# Patient Record
Sex: FEMALE | Race: WHITE | NOT HISPANIC OR LATINO | URBAN - METROPOLITAN AREA
[De-identification: names, ages, dates, MRNs, and addresses within clinical notes are randomized per-mention and may not be internally consistent; named-entity substitution may affect disease eponyms.]

---

## 2019-11-11 ENCOUNTER — INPATIENT (INPATIENT)
Facility: HOSPITAL | Age: 34
LOS: 5 days | Discharge: HOME | DRG: 988 | End: 2019-11-17
Attending: SURGERY | Admitting: SURGERY
Payer: MEDICAID

## 2019-11-11 VITALS
SYSTOLIC BLOOD PRESSURE: 117 MMHG | HEART RATE: 110 BPM | DIASTOLIC BLOOD PRESSURE: 72 MMHG | RESPIRATION RATE: 16 BRPM | HEIGHT: 63 IN | TEMPERATURE: 98 F | OXYGEN SATURATION: 97 % | WEIGHT: 210.1 LBS

## 2019-11-11 DIAGNOSIS — S36.039A UNSPECIFIED LACERATION OF SPLEEN, INITIAL ENCOUNTER: ICD-10-CM

## 2019-11-11 LAB
ALBUMIN SERPL ELPH-MCNC: 3.5 G/DL — SIGNIFICANT CHANGE UP (ref 3.3–5)
ALP SERPL-CCNC: 74 U/L — SIGNIFICANT CHANGE UP (ref 40–120)
ALT FLD-CCNC: 19 U/L — SIGNIFICANT CHANGE UP (ref 10–45)
ANION GAP SERPL CALC-SCNC: 13 MMOL/L — SIGNIFICANT CHANGE UP (ref 5–17)
APTT BLD: 26.8 SEC — LOW (ref 27.5–36.3)
AST SERPL-CCNC: 26 U/L — SIGNIFICANT CHANGE UP (ref 10–40)
BASE EXCESS BLDV CALC-SCNC: -2.6 MMOL/L — LOW (ref -2–2)
BASOPHILS # BLD AUTO: 0.03 K/UL — SIGNIFICANT CHANGE UP (ref 0–0.2)
BASOPHILS # BLD AUTO: 0.04 K/UL — SIGNIFICANT CHANGE UP (ref 0–0.2)
BASOPHILS NFR BLD AUTO: 0.2 % — SIGNIFICANT CHANGE UP (ref 0–2)
BASOPHILS NFR BLD AUTO: 0.3 % — SIGNIFICANT CHANGE UP (ref 0–2)
BILIRUB SERPL-MCNC: 0.2 MG/DL — SIGNIFICANT CHANGE UP (ref 0.2–1.2)
BLD GP AB SCN SERPL QL: NEGATIVE — SIGNIFICANT CHANGE UP
BUN SERPL-MCNC: 22 MG/DL — SIGNIFICANT CHANGE UP (ref 7–23)
CA-I SERPL-SCNC: 1.19 MMOL/L — SIGNIFICANT CHANGE UP (ref 1.12–1.3)
CALCIUM SERPL-MCNC: 8.8 MG/DL — SIGNIFICANT CHANGE UP (ref 8.4–10.5)
CHLORIDE BLDV-SCNC: 115 MMOL/L — HIGH (ref 96–108)
CHLORIDE SERPL-SCNC: 104 MMOL/L — SIGNIFICANT CHANGE UP (ref 96–108)
CO2 BLDV-SCNC: 24 MMOL/L — SIGNIFICANT CHANGE UP (ref 22–30)
CO2 SERPL-SCNC: 23 MMOL/L — SIGNIFICANT CHANGE UP (ref 22–31)
CREAT SERPL-MCNC: 1.16 MG/DL — SIGNIFICANT CHANGE UP (ref 0.5–1.3)
EOSINOPHIL # BLD AUTO: 0.15 K/UL — SIGNIFICANT CHANGE UP (ref 0–0.5)
EOSINOPHIL # BLD AUTO: 0.35 K/UL — SIGNIFICANT CHANGE UP (ref 0–0.5)
EOSINOPHIL NFR BLD AUTO: 1.2 % — SIGNIFICANT CHANGE UP (ref 0–6)
EOSINOPHIL NFR BLD AUTO: 2.8 % — SIGNIFICANT CHANGE UP (ref 0–6)
GAS PNL BLDA: SIGNIFICANT CHANGE UP
GAS PNL BLDV: 137 MMOL/L — SIGNIFICANT CHANGE UP (ref 135–145)
GAS PNL BLDV: SIGNIFICANT CHANGE UP
GLUCOSE BLDV-MCNC: 87 MG/DL — SIGNIFICANT CHANGE UP (ref 70–99)
GLUCOSE SERPL-MCNC: 128 MG/DL — HIGH (ref 70–99)
HCO3 BLDV-SCNC: 22 MMOL/L — SIGNIFICANT CHANGE UP (ref 21–29)
HCT VFR BLD CALC: 23.9 % — LOW (ref 34.5–45)
HCT VFR BLD CALC: 28.3 % — LOW (ref 34.5–45)
HCT VFR BLD CALC: 33.4 % — LOW (ref 34.5–45)
HCT VFR BLDA CALC: 34 % — LOW (ref 39–50)
HGB BLD CALC-MCNC: 11.1 G/DL — LOW (ref 11.5–15.5)
HGB BLD-MCNC: 10.7 G/DL — LOW (ref 11.5–15.5)
HGB BLD-MCNC: 7.9 G/DL — LOW (ref 11.5–15.5)
HGB BLD-MCNC: 9.4 G/DL — LOW (ref 11.5–15.5)
HOROWITZ INDEX BLDV+IHG-RTO: 28 — SIGNIFICANT CHANGE UP
IMM GRANULOCYTES NFR BLD AUTO: 0.2 % — SIGNIFICANT CHANGE UP (ref 0–1.5)
IMM GRANULOCYTES NFR BLD AUTO: 0.4 % — SIGNIFICANT CHANGE UP (ref 0–1.5)
INR BLD: 1.05 RATIO — SIGNIFICANT CHANGE UP (ref 0.88–1.16)
LACTATE BLDV-MCNC: 1.4 MMOL/L — SIGNIFICANT CHANGE UP (ref 0.7–2)
LYMPHOCYTES # BLD AUTO: 17.2 % — SIGNIFICANT CHANGE UP (ref 13–44)
LYMPHOCYTES # BLD AUTO: 2.17 K/UL — SIGNIFICANT CHANGE UP (ref 1–3.3)
LYMPHOCYTES # BLD AUTO: 25.2 % — SIGNIFICANT CHANGE UP (ref 13–44)
LYMPHOCYTES # BLD AUTO: 3.15 K/UL — SIGNIFICANT CHANGE UP (ref 1–3.3)
MCHC RBC-ENTMCNC: 29.2 PG — SIGNIFICANT CHANGE UP (ref 27–34)
MCHC RBC-ENTMCNC: 29.9 PG — SIGNIFICANT CHANGE UP (ref 27–34)
MCHC RBC-ENTMCNC: 30.1 PG — SIGNIFICANT CHANGE UP (ref 27–34)
MCHC RBC-ENTMCNC: 32 GM/DL — SIGNIFICANT CHANGE UP (ref 32–36)
MCHC RBC-ENTMCNC: 33.1 GM/DL — SIGNIFICANT CHANGE UP (ref 32–36)
MCHC RBC-ENTMCNC: 33.2 GM/DL — SIGNIFICANT CHANGE UP (ref 32–36)
MCV RBC AUTO: 90.5 FL — SIGNIFICANT CHANGE UP (ref 80–100)
MCV RBC AUTO: 90.7 FL — SIGNIFICANT CHANGE UP (ref 80–100)
MCV RBC AUTO: 91.3 FL — SIGNIFICANT CHANGE UP (ref 80–100)
MONOCYTES # BLD AUTO: 0.74 K/UL — SIGNIFICANT CHANGE UP (ref 0–0.9)
MONOCYTES # BLD AUTO: 0.75 K/UL — SIGNIFICANT CHANGE UP (ref 0–0.9)
MONOCYTES NFR BLD AUTO: 5.9 % — SIGNIFICANT CHANGE UP (ref 2–14)
MONOCYTES NFR BLD AUTO: 6 % — SIGNIFICANT CHANGE UP (ref 2–14)
NEUTROPHILS # BLD AUTO: 8.18 K/UL — HIGH (ref 1.8–7.4)
NEUTROPHILS # BLD AUTO: 9.46 K/UL — HIGH (ref 1.8–7.4)
NEUTROPHILS NFR BLD AUTO: 65.6 % — SIGNIFICANT CHANGE UP (ref 43–77)
NEUTROPHILS NFR BLD AUTO: 75 % — SIGNIFICANT CHANGE UP (ref 43–77)
NRBC # BLD: 0 /100 WBCS — SIGNIFICANT CHANGE UP (ref 0–0)
OTHER CELLS CSF MANUAL: 9 ML/DL — LOW (ref 18–22)
PCO2 BLDV: 43 MMHG — SIGNIFICANT CHANGE UP (ref 35–50)
PH BLDV: 7.34 — LOW (ref 7.35–7.45)
PLATELET # BLD AUTO: 155 K/UL — SIGNIFICANT CHANGE UP (ref 150–400)
PLATELET # BLD AUTO: 177 K/UL — SIGNIFICANT CHANGE UP (ref 150–400)
PLATELET # BLD AUTO: 181 K/UL — SIGNIFICANT CHANGE UP (ref 150–400)
PO2 BLDV: 38 MMHG — SIGNIFICANT CHANGE UP (ref 25–45)
POTASSIUM BLDV-SCNC: 3.7 MMOL/L — SIGNIFICANT CHANGE UP (ref 3.5–5.3)
POTASSIUM SERPL-MCNC: 4.8 MMOL/L — SIGNIFICANT CHANGE UP (ref 3.5–5.3)
POTASSIUM SERPL-SCNC: 4.8 MMOL/L — SIGNIFICANT CHANGE UP (ref 3.5–5.3)
PROT SERPL-MCNC: 6.1 G/DL — SIGNIFICANT CHANGE UP (ref 6–8.3)
PROTHROM AB SERPL-ACNC: 12 SEC — SIGNIFICANT CHANGE UP (ref 10–12.9)
RBC # BLD: 2.64 M/UL — LOW (ref 3.8–5.2)
RBC # BLD: 3.12 M/UL — LOW (ref 3.8–5.2)
RBC # BLD: 3.66 M/UL — LOW (ref 3.8–5.2)
RBC # FLD: 13.1 % — SIGNIFICANT CHANGE UP (ref 10.3–14.5)
RBC # FLD: 13.2 % — SIGNIFICANT CHANGE UP (ref 10.3–14.5)
RBC # FLD: 13.3 % — SIGNIFICANT CHANGE UP (ref 10.3–14.5)
RH IG SCN BLD-IMP: POSITIVE — SIGNIFICANT CHANGE UP
RH IG SCN BLD-IMP: POSITIVE — SIGNIFICANT CHANGE UP
SAO2 % BLDV: 60 % — LOW (ref 67–88)
SODIUM SERPL-SCNC: 140 MMOL/L — SIGNIFICANT CHANGE UP (ref 135–145)
WBC # BLD: 12.49 K/UL — HIGH (ref 3.8–10.5)
WBC # BLD: 12.61 K/UL — HIGH (ref 3.8–10.5)
WBC # BLD: 13.98 K/UL — HIGH (ref 3.8–10.5)
WBC # FLD AUTO: 12.49 K/UL — HIGH (ref 3.8–10.5)
WBC # FLD AUTO: 12.61 K/UL — HIGH (ref 3.8–10.5)
WBC # FLD AUTO: 13.98 K/UL — HIGH (ref 3.8–10.5)

## 2019-11-11 PROCEDURE — 37244 VASC EMBOLIZE/OCCLUDE BLEED: CPT

## 2019-11-11 PROCEDURE — 99291 CRITICAL CARE FIRST HOUR: CPT

## 2019-11-11 PROCEDURE — 73030 X-RAY EXAM OF SHOULDER: CPT | Mod: 26,RT

## 2019-11-11 PROCEDURE — 74177 CT ABD & PELVIS W/CONTRAST: CPT | Mod: 26

## 2019-11-11 PROCEDURE — 71260 CT THORAX DX C+: CPT | Mod: 26

## 2019-11-11 PROCEDURE — 76937 US GUIDE VASCULAR ACCESS: CPT | Mod: 26

## 2019-11-11 PROCEDURE — 93010 ELECTROCARDIOGRAM REPORT: CPT

## 2019-11-11 PROCEDURE — 71045 X-RAY EXAM CHEST 1 VIEW: CPT | Mod: 26

## 2019-11-11 PROCEDURE — 36247 INS CATH ABD/L-EXT ART 3RD: CPT

## 2019-11-11 PROCEDURE — 36248 INS CATH ABD/L-EXT ART ADDL: CPT

## 2019-11-11 RX ORDER — ACETAMINOPHEN 500 MG
975 TABLET ORAL ONCE
Refills: 0 | Status: COMPLETED | OUTPATIENT
Start: 2019-11-11 | End: 2019-11-11

## 2019-11-11 RX ORDER — ACETAMINOPHEN 500 MG
1000 TABLET ORAL EVERY 6 HOURS
Refills: 0 | Status: COMPLETED | OUTPATIENT
Start: 2019-11-11 | End: 2019-11-12

## 2019-11-11 RX ORDER — OXYCODONE HYDROCHLORIDE 5 MG/1
5 TABLET ORAL ONCE
Refills: 0 | Status: DISCONTINUED | OUTPATIENT
Start: 2019-11-11 | End: 2019-11-11

## 2019-11-11 RX ORDER — SODIUM CHLORIDE 9 MG/ML
1000 INJECTION, SOLUTION INTRAVENOUS
Refills: 0 | Status: DISCONTINUED | OUTPATIENT
Start: 2019-11-11 | End: 2019-11-13

## 2019-11-11 RX ORDER — IPRATROPIUM/ALBUTEROL SULFATE 18-103MCG
3 AEROSOL WITH ADAPTER (GRAM) INHALATION ONCE
Refills: 0 | Status: COMPLETED | OUTPATIENT
Start: 2019-11-11 | End: 2019-11-11

## 2019-11-11 RX ORDER — ONDANSETRON 8 MG/1
4 TABLET, FILM COATED ORAL ONCE
Refills: 0 | Status: COMPLETED | OUTPATIENT
Start: 2019-11-11 | End: 2019-11-11

## 2019-11-11 RX ORDER — MORPHINE SULFATE 50 MG/1
2 CAPSULE, EXTENDED RELEASE ORAL ONCE
Refills: 0 | Status: DISCONTINUED | OUTPATIENT
Start: 2019-11-11 | End: 2019-11-11

## 2019-11-11 RX ORDER — SODIUM CHLORIDE 9 MG/ML
1000 INJECTION INTRAMUSCULAR; INTRAVENOUS; SUBCUTANEOUS ONCE
Refills: 0 | Status: COMPLETED | OUTPATIENT
Start: 2019-11-11 | End: 2019-11-11

## 2019-11-11 RX ORDER — HYDROMORPHONE HYDROCHLORIDE 2 MG/ML
0.5 INJECTION INTRAMUSCULAR; INTRAVENOUS; SUBCUTANEOUS
Refills: 0 | Status: DISCONTINUED | OUTPATIENT
Start: 2019-11-11 | End: 2019-11-12

## 2019-11-11 RX ORDER — CHLORHEXIDINE GLUCONATE 213 G/1000ML
1 SOLUTION TOPICAL DAILY
Refills: 0 | Status: DISCONTINUED | OUTPATIENT
Start: 2019-11-11 | End: 2019-11-14

## 2019-11-11 RX ADMIN — Medication 400 MILLIGRAM(S): at 23:00

## 2019-11-11 RX ADMIN — OXYCODONE HYDROCHLORIDE 5 MILLIGRAM(S): 5 TABLET ORAL at 11:30

## 2019-11-11 RX ADMIN — HYDROMORPHONE HYDROCHLORIDE 0.5 MILLIGRAM(S): 2 INJECTION INTRAMUSCULAR; INTRAVENOUS; SUBCUTANEOUS at 21:21

## 2019-11-11 RX ADMIN — HYDROMORPHONE HYDROCHLORIDE 0.5 MILLIGRAM(S): 2 INJECTION INTRAMUSCULAR; INTRAVENOUS; SUBCUTANEOUS at 18:00

## 2019-11-11 RX ADMIN — OXYCODONE HYDROCHLORIDE 5 MILLIGRAM(S): 5 TABLET ORAL at 10:37

## 2019-11-11 RX ADMIN — HYDROMORPHONE HYDROCHLORIDE 0.5 MILLIGRAM(S): 2 INJECTION INTRAMUSCULAR; INTRAVENOUS; SUBCUTANEOUS at 21:45

## 2019-11-11 RX ADMIN — MORPHINE SULFATE 2 MILLIGRAM(S): 50 CAPSULE, EXTENDED RELEASE ORAL at 15:50

## 2019-11-11 RX ADMIN — Medication 975 MILLIGRAM(S): at 10:37

## 2019-11-11 RX ADMIN — Medication 975 MILLIGRAM(S): at 11:30

## 2019-11-11 RX ADMIN — ONDANSETRON 4 MILLIGRAM(S): 8 TABLET, FILM COATED ORAL at 22:41

## 2019-11-11 RX ADMIN — SODIUM CHLORIDE 100 MILLILITER(S): 9 INJECTION, SOLUTION INTRAVENOUS at 22:41

## 2019-11-11 RX ADMIN — SODIUM CHLORIDE 2000 MILLILITER(S): 9 INJECTION INTRAMUSCULAR; INTRAVENOUS; SUBCUTANEOUS at 15:03

## 2019-11-11 RX ADMIN — HYDROMORPHONE HYDROCHLORIDE 0.5 MILLIGRAM(S): 2 INJECTION INTRAMUSCULAR; INTRAVENOUS; SUBCUTANEOUS at 18:15

## 2019-11-11 RX ADMIN — Medication 3 MILLILITER(S): at 21:14

## 2019-11-11 RX ADMIN — MORPHINE SULFATE 2 MILLIGRAM(S): 50 CAPSULE, EXTENDED RELEASE ORAL at 15:37

## 2019-11-11 NOTE — PROGRESS NOTE ADULT - SUBJECTIVE AND OBJECTIVE BOX
Interventional Radiology Pre-Procedure Note    This is a 34y Female presents to the ED after sustaining a fall down multiple steps yesterday. Patient denies LOC or head impact and is not on any anticoagulation. Patient was in her usual state of health prior to the fall and denies any preceding dizziness, or blurry vision. Patient recalls the sequence of events and was able to ambulate after the fall by herself. She initially felt well but was experiencing worsening chest and abdominal pain prompting the visit to the ED.  On admission, the patient was HD stable, with interval decrease in blood pressure which responded to 1 L saline boluses. Imaging revealed a large hemoperitoneum, with a foci of hyperdensity suspicious for acute hemorrhage. IR consulted for splenic embolization.    NPO: 11/10/19 pm  Anticoagulation: none  Antibiotics: none  Adverse reaction to anesthesia: denies  Objection to blood products: none    PAST MEDICAL & SURGICAL HISTORY:  Smoking  No significant past surgical history     Vital Signs Last 24 Hrs  T(C): 36.6 (11 Nov 2019 17:50), Max: 36.8 (11 Nov 2019 09:41)  T(F): 97.9 (11 Nov 2019 17:50), Max: 98.2 (11 Nov 2019 09:41)  HR: 82 (11 Nov 2019 18:00) (71 - 110)  BP: 94/50 (11 Nov 2019 18:00) (90/55 - 126/68)  BP(mean): 65 (11 Nov 2019 18:00) (65 - 81)  RR: 24 (11 Nov 2019 18:00) (16 - 24)  SpO2: 95% (11 Nov 2019 18:00) (84% - 97%)    Allergies: No Known Allergies    Physical Exam: Gen: NAD; A&Ox3    Labs:                         7.9    12.49 )-----------( 181      ( 11 Nov 2019 17:13 )             23.9     11-11    140  |  104  |  22  ----------------------------<  128<H>  4.8   |  23  |  1.16    Ca    8.8      11 Nov 2019 11:12    TPro  6.1  /  Alb  3.5  /  TBili  0.2  /  DBili  x   /  AST  26  /  ALT  19  /  AlkPhos  74  11-11    PT/INR - ( 11 Nov 2019 17:13 )   PT: 12.0 sec;   INR: 1.05 ratio      PTT - ( 11 Nov 2019 17:13 )  PTT:26.8 sec    Informed consent obtained. All questions and concerns have been addressed at this time.     Plan: Splenic angio with possible embolization.

## 2019-11-11 NOTE — H&P ADULT - NSHPLABSRESULTS_GEN_ALL_CORE
LABS  CBC (11-11 @ 17:13)                              7.9<L>                         12.49<H>  )----------------(  181        65.6  % Neutrophils, 25.2  % Lymphocytes, ANC: 8.18<H>                              23.9<L>  CBC (11-11 @ 11:12)                              9.4<L>                         12.61<H>  )----------------(  177        75.0  % Neutrophils, 17.2  % Lymphocytes, ANC: 9.46<H>                              28.3<L>    BMP (11-11 @ 11:12)             140     |  104     |  22    		Ca++ --      Ca 8.8                ---------------------------------( 128<H>		Mg --                 4.8     |  23      |  1.16  			Ph --        LFTs (11-11 @ 11:12)      TPro 6.1 / Alb 3.5 / TBili 0.2 / DBili -- / AST 26 / ALT 19 / AlkPhos 74  Coags (11-11 @ 17:13)  aPTT 26.8<L> / INR 1.05 / PT 12.0    IMAGING  < from: CT Abdomen and Pelvis w/ IV Cont (11.11.19 @ 16:23) >    FINDINGS:    CHEST:     LUNGS AND LARGE AIRWAYS: Patent central airways. Areas of linear   atelectasis in the bilateral lower lobes, upper lobes, and right middle   lobe.   PLEURA: No pleural effusion or pneumothorax.  VESSELS: Within normal limits.  HEART: Heart size is normal. No pericardial effusion.  MEDIASTINUM AND KARTHIKEYAN: No lymphadenopathy.  CHEST WALL AND LOWER NECK: Within normal limits.    ABDOMEN AND PELVIS:    LIVER: Within normal limits.  BILE DUCTS: Normal caliber.  GALLBLADDER: Within normal limits.  SPLEEN: Upper limits of normal in size.  Numerous splenic parenchymal   lacerations which measure greater than 3 cm in depth, with greatest   number at the inferior splenic pole. There is a 1 cm arterial enhancing   focus at the inferior splenic pole (series 4 image 404) which does not   persist on the venous phase.The splenic hilar vessels are intact. There   is no subcapsular hematoma.  PANCREAS: Within normal limits.  ADRENALS: Within normal limits.  KIDNEYS/URETERS: Within normal limits.    BLADDER: Within normal limits.  REPRODUCTIVE ORGANS: Uterus and adnexa within normallimits.    BOWEL: No bowel obstruction.   PERITONEUM: Moderate to large volume hemoperitoneum with largest amount   of hyperdense blood at the inferior margin of the spleen.  VESSELS: Direct origin of the hepatic artery from the aorta.  RETROPERITONEUM/LYMPH NODES: No lymphadenopathy.    ABDOMINAL WALL: Within normal limits.  BONES: No acute fractures.     IMPRESSION:     Grade 3 to 4 splenic injury characterized by multiple deep parenchymal   lacerations measuring greater than 3 cm in depth, as well as an   arterially enhancing site at the inferior splenic pole which may   represent a pseudoaneurysm vs active site of bleeding.    Moderate to large volume hemoperitoneum.    Findings were discussed by Dr. Neff with WINDY Burch on 11/11/2019at   4:33 PM with readback and by Dr. Jiang with Dr. Gan of trauma   surgery on 11/11/2019 at 5 pm.    < end of copied text >      --------------------------------------------------------------------------------------------

## 2019-11-11 NOTE — H&P ADULT - HISTORY OF PRESENT ILLNESS
Elaina Rodriguez is a 34 year old F with no significant pmhx or pshx who presents to the ED after sustaining a fall down multiple steps yesterday. Patient sustained a mechanical fall while taking out the garbage yesterday afternoon, denies LOC or head impact and is not on any anticoagulation. Patient was in her usual state of health prior to the fall and denies any preceding dizziness, or blurry vision. Patient recalls the sequence of events and was able to ambulate after the fall by herself. She initially felt well but was experiencing worsening chest and abdominal pain prompting the visit to the ED.

## 2019-11-11 NOTE — CONSULT NOTE ADULT - SUBJECTIVE AND OBJECTIVE BOX
SICU CONSULT NOTE    HPI  COLIN CHARLES is a 34y Female with no significant pmhx or pshx who presents to the ED after sustaining a fall down multiple steps yesterday. Patient sustained a mechanical fall while taking out the garbage yesterday afternoon, denies LOC or head impact and is not on any anticoagulation. Patient was in her usual state of health prior to the fall and denies any preceding dizziness, or blurry vision. Patient recalls the sequence of events and was able to ambulate after the fall by herself. She initially felt well but was experiencing worsening chest and abdominal pain, prompting her to call EMS to bring her to the ED for further evaluation.     Upon presentation her H/H was 9.4/28.3. A CT scan was obtained demonstrating grade 3 to 4 splenic injury characterized by multiple deep parenchymal lacerations measuring greater than 3 cm in depth, as well as an arterially enhancing site at the inferior splenic pole which may represent a pseudoaneurysm vs active site of bleeding along with moderate to large volume hemoperitoneum. A repeat H/H was 7.9/23.9. She was ordered 2 units of pRBCs and IR was consulted for embolization. SICU was consulted for hemodynamic monitoring.      PAST MEDICAL HISTORY: Smoking  No pertinent past medical history      PAST SURGICAL HISTORY: No significant past surgical history      FAMILY HISTORY:     SOCIAL HISTORY:    CODE STATUS:     HOME MEDICATIONS:  Denies    ALLERGIES: No Known Allergies      VITAL SIGNS:  ICU Vital Signs Last 24 Hrs  T(C): 36.6 (11 Nov 2019 17:50), Max: 36.8 (11 Nov 2019 09:41)  T(F): 97.9 (11 Nov 2019 17:50), Max: 98.2 (11 Nov 2019 09:41)  HR: 82 (11 Nov 2019 18:00) (71 - 110)  BP: 94/50 (11 Nov 2019 18:00) (90/55 - 126/68)  BP(mean): 65 (11 Nov 2019 18:00) (65 - 81)  ABP: --  ABP(mean): --  RR: 24 (11 Nov 2019 18:00) (16 - 24)  SpO2: 95% (11 Nov 2019 18:00) (84% - 97%)      NEURO  Exam: alert, oriented, responding appropriately  acetaminophen  IVPB .. 1000 milliGRAM(s) IV Intermittent every 6 hours  HYDROmorphone  Injectable 0.5 milliGRAM(s) IV Push every 3 hours PRN Severe Pain (7 - 10)      RESPIRATORY  Mechanical Ventilation: none    Exam: equal chest rise, no acute distress      CARDIOVASCULAR    Exam: tachycardic  Cardiac Rhythm: regular      GI/NUTRITION  Exam: soft, diffusely tender with guarding  Diet: NPO since yesterday      GENITOURINARY/RENAL  lactated ringers. 1000 milliLiter(s) IV Continuous <Continuous>      Weight (kg): 95.3 (11-11 @ 09:41)  11-11    140  |  104  |  22  ----------------------------<  128<H>  4.8   |  23  |  1.16    Ca    8.8      11 Nov 2019 11:12    TPro  6.1  /  Alb  3.5  /  TBili  0.2  /  DBili  x   /  AST  26  /  ALT  19  /  AlkPhos  74  11-11    [ ] Kelly catheter, indication: urine output monitoring in critically ill patient    HEMATOLOGIC  [ ] VTE Prophylaxis:                          7.9    12.49 )-----------( 181      ( 11 Nov 2019 17:13 )             23.9     PT/INR - ( 11 Nov 2019 17:13 )   PT: 12.0 sec;   INR: 1.05 ratio         PTT - ( 11 Nov 2019 17:13 )  PTT:26.8 sec  Transfusion: [ ] PRBC	[ ] Platelets	[ ] FFP	[ ] Cryoprecipitate      INFECTIOUS DISEASES    RECENT CULTURES:      ENDOCRINE    CAPILLARY BLOOD GLUCOSE          PATIENT CARE ACCESS DEVICES:  [ ] Peripheral IV  [ ] Central Venous Line	[ ] R	[ ] L	[ ] IJ	[ ] Fem	[ ] SC	Placed:   [ ] Arterial Line		[ ] R	[ ] L	[ ] Fem	[ ] Rad	[ ] Ax	Placed:   [ ] PICC:					[ ] Mediport  [ ] Urinary Catheter, Date Placed:   [x] Necessity of urinary, arterial, and venous catheters discussed    OTHER MEDICATIONS: chlorhexidine 2% Cloths 1 Application(s) Topical daily      IMAGING STUDIES:

## 2019-11-11 NOTE — ED ADULT NURSE NOTE - OBJECTIVE STATEMENT
0950 34 yr old WF brought to ER via ambulance on stretcher for further eval and tx of severe right rib pain, SOB and right shoulder pain. s/p tripped and fell down 4 steps at home yesterday while taking the garbage out. no LOC. Upon arrival to ER, color pale, looks drowsy. Ox4. right rib pain worsens upon deep insp and feels SOB. Right shoulder pain with ROM. States she landed on her right side when she fell. Refused to put on pt gown initially. Lungs clear . abd soft. right lower ribs TTP. Pt states difficulty walking since she fell. No HA, dizziness, numbness or incontinence. MAEx4

## 2019-11-11 NOTE — H&P ADULT - ASSESSMENT
Elaina Rodriguez is a 34 year old F with no significant pm/pshx who is s/p mechanical fall yesterday and presented to the ED due to worsening chest and abdominal pain worsened with inspiration. On admission, the patient was HD stable, with interval decrease in blood pressure which responded to 1 L saline boluses. Imaging revealed a large hemoperitoneum, with a foci of hyperdensity suspicious for acute hemorrhage. IR and SICU consulted.    PLAN:  - Patient to undergo splenic artery embolization with IR  - SICU consultation for hemorrhage watch, please appreciate care recs.  - PRN pRBC tranfusion if Hb < 7.0 or symptomatic hemorrhage.   - No acute surgical intervention warranted at this time, unless failure of IR embolization or acute hemodynamic instability.  - Patient seen/examined with attending.  - Plan discussed with Attending, Dr. CORINNE Virgen.

## 2019-11-11 NOTE — ED PROVIDER NOTE - ATTENDING CONTRIBUTION TO CARE
Attending MD Ruvalcaba:   I personally have seen and examined this patient.  ACP, Resident, medical student note reviewed and agree on plan of care and except where noted.     34y F otherwise healthy brought in by ambulance for right rib pain, right shoulder pain, generalized abdominal s/p mechanical fall down 4 stairs.    on exam patient is well appearing, overweight, vitals wnl. rrr s1s2, lungs ctab, abdomen soft generalized tenderness to light palpation, tenderness to palpation right rights, tenderness to palpation right superior shoulder.    Concern for traumatic injury, will obtain xrays, give pain control. If no injury found on XR and pain not controlled with oral meds, will obtain advanced imaging.

## 2019-11-11 NOTE — ED ADULT NURSE REASSESSMENT NOTE - NS ED NURSE REASSESS COMMENT FT1
Patient returned from CT. Patient given morphine in CT. Patient reports improvement in pain level. Currently 4/10. Patient reports pain when taking a deep breath. Hypoxic on room air to 84%. Patient placed on 6L nasal canula. PA & MD aware. Will continue to monitor.
c/o rib pain 6/10 at CT scan, unable to lay flat for procedure, pt was given 2mg of morphine IVP now at CT scan.

## 2019-11-11 NOTE — ED PROVIDER NOTE - OBJECTIVE STATEMENT
AV: 34y F PMH knee arthroscopy presents with right flank pain s/p fall down stairs. Patient states she was taking garbage downstairs into basement, tripped and fell forward down four concrete carpeted stairs. She is unsure if she lost consciousness but she has full recall of events. Was able to get up on her own. Last night felt dizzy but did not have headache, nausea, or vomiting. She did not sleep last night because she was concerned she had a concussion. Since the fall she has had increasing pain over right ribs associated with pain with breathing. Took ibuprofen 800mg early this morning without improvement. Also complains of abdominal pain. Patient is a nurse.

## 2019-11-11 NOTE — ED PROVIDER NOTE - CARE PLAN
Principal Discharge DX:	Splenic laceration, initial encounter  Secondary Diagnosis:	Fall down stairs, initial encounter

## 2019-11-11 NOTE — CONSULT NOTE ADULT - ATTENDING COMMENTS
Patient seen and examined and agree with above.   34 year old female with no significant past medical history who fell down the stairs yesterday and hit the left side of the abdomen. The patient presented today with continued abdominal pain. Initially after the injury the patient did state that she felt light headed but decided to rest at that time.  GCS 15  Did not hit her head or have loss of consciousness.  SHe is hypotensive compared to her normal (  mmHg) and H/h 9.4/28.3 which is consistent with acute blood loss anemia.  She underwent CT scan which demonstrates Grade 3 to 4 splenic injury characterized by multiple deep parenchymal lacerations measuring greater than 3 cm in depth, as well as an arterially enhancing site at the inferior splenic pole which may represent a pseudoaneurysm vs active site of bleeding. Moderate to large volume hemoperitoneum.     On exam she is tender to the left lower quadrant mostly. She is taking shallow breaths due to the pain.  She also complains of right shoulder pain to palpation.  No other gross abnormalities noted.     XR shoulder and humerus - pending     Labs reviewed.     A/P: 34 year old female s/p fall down stairs with grade 3-4 splenic laceration with active extravasation vs pseudoaneurysm.  -Admit to SICU   - Patient to get emergent IR embolization   -NPO, IVF  -2 units PRBC to be transfused for acute blood loss anemia secondary to traumatic splenic injury  -WIll monitor hypotension and response to blood transfusions with goal MAP > 65 mmHg   -Pain control   -Tertiary exam in the AM    CC time: 60 minutes .

## 2019-11-11 NOTE — CONSULT NOTE ADULT - ASSESSMENT
ASSESSMENT:  COLIN CHARLES is a 34y Female otherwise healthy who presents after a mechanical fall yesterday found to have grade III-IV splenic laceration and moderate-large hemoperitoneum, with plan for IR embolization.    PLAN:    NEURO: alert, oriented  - Pain control with standing tylenol and prn dilaudid    RESPIRATORY:   - No active issues    CARDIOVASCULAR: Hypotensive to systolic 90s (usually 120s)  - Hemodynamic monitoring  - f/u post-transfusion CBC, lactate  - Bedrest in setting of active bleeding    GI/NUTRITION: mod-large volume hemoperitoneum  - NPO / IVF for embolization  - Serial abdominal exams    GENITOURINARY/RENAL:  - Monitor I/Os, electrolytes, Cr    HEMATOLOGIC:  - Post-transfusion CBC then q4, trend H/H  - Hold VTE ppx in setting of active bleeding    INFECTIOUS DISEASE:  - No active issues    ENDOCRINE:  - No active issues    ANDREW Deras, PGY-2  SICU [id number]

## 2019-11-11 NOTE — ED PROVIDER NOTE - PROGRESS NOTE DETAILS
patient seen ambulating to the bathroom with steady gait. pending CTs now. -Raine Burch PA-C received call from radiology stating patient has high grade splenic lac with concern for active extravasation. repeat vs show BP now 90/55. trauma surgery consulted immediately. patient placed in room with second IV on monitor. -Raine Burch PA-C AV: patient seen by surgery, planned for possible IR procedure and admission to SICU. AV: patient seen by surgery, planned for IR procedure and admission to SICU. Transfusing emergent release blood.

## 2019-11-11 NOTE — ED ADULT TRIAGE NOTE - O2 CONCENTRATION (%)
Pt needed to reschedule her date, and requested 11/30 which she is scheduled at 930am arrival 730am.  Pt needed later time in the day, she could not make it to any 7:15am appt.    Rescheduled with donna in surg scheduling    2

## 2019-11-11 NOTE — H&P ADULT - NSHPPHYSICALEXAM_GEN_ALL_CORE
Vitals:   T(C): 36.6 (11-11-19 @ 16:00), Max: 36.8 (11-11-19 @ 09:41)  HR: 79 (11-11-19 @ 16:00) (71 - 110)  BP: 90/55 (11-11-19 @ 16:00) (90/55 - 126/68)  RR: 20 (11-11-19 @ 16:10) (16 - 20)  SpO2: 92% (11-11-19 @ 16:10) (84% - 97%)  CAPILLARY BLOOD GLUCOSE    Height (cm): 160.02 (11-11 @ 09:41)  Weight (kg): 95.3 (11-11 @ 09:41)  BMI (kg/m2): 37.2 (11-11 @ 09:41)  BSA (m2): 1.97 (11-11 @ 09:41)    PHYSICAL EXAM:  General: NAD, sitting in bed comfortably  Neuro: A+Ox3  HEENT: NC/AT, EOMI, anicteric sclerae  Neck: Soft, supple  Cardio: RRR, nml S1/S2  Resp: Moderate effort, CTABL, no respiratory distress  Thorax: No chest wall tenderness  GI/Abd: Soft, mild distension with generalized tenderness to palpation with foci of tenderness in the LUQ, no rebound/guarding, no masses palpated  Vascular: All 4 extremities warm.  Skin: Intact, no breakdown  Musculoskeletal: All 4 extremities moving spontaneously, no limitations  --------------------------------------------------------------------------------------------

## 2019-11-11 NOTE — H&P ADULT - ATTENDING COMMENTS
Patient seen and examined and agree with above.   34 year old female with no significant past medical history who fell down the stairs yesterday and hit the left side of the abdomen. The patient presented today with continued abdominal pain. Initially after the injury the patient did state that she felt light headed but decided to rest at that time.  GCS 15  Did not hit her head or have loss of consciousness.  SHe is hypotensive compared to her normal (  mmHg) and H/h 9.4/28.3 which is consistent with acute blood loss anemia.  She underwent CT scan which demonstrates Grade 3 to 4 splenic injury characterized by multiple deep parenchymal lacerations measuring greater than 3 cm in depth, as well as an arterially enhancing site at the inferior splenic pole which may represent a pseudoaneurysm vs active site of bleeding. Moderate to large volume hemoperitoneum.     On exam she is tender to the left lower quadrant mostly. She is taking shallow breaths due to the pain.  She also complains of right shoulder pain to palpation.  No other gross abnormalities noted.     XR shoulder and humerus - pending     Labs reviewed.     A/P: 34 year old female s/p fall down stairs with grade 3-4 splenic laceration with active extravasation vs pseudoaneurysm.  -Admit to SICU   - Patient to get emergent IR embolization   -NPO, IVF  -2 units PRBC to be transfused for acute blood loss anemia secondary to traumatic splenic injury  -Pain control   -Tertiary exam in the AM    CC time Patient seen and examined and agree with above.   34 year old female with no significant past medical history who fell down the stairs yesterday and hit the left side of the abdomen. The patient presented today with continued abdominal pain. Initially after the injury the patient did state that she felt light headed but decided to rest at that time.  GCS 15  Did not hit her head or have loss of consciousness.  SHe is hypotensive compared to her normal (  mmHg) and H/h 9.4/28.3 which is consistent with acute blood loss anemia.  She underwent CT scan which demonstrates Grade 3 to 4 splenic injury characterized by multiple deep parenchymal lacerations measuring greater than 3 cm in depth, as well as an arterially enhancing site at the inferior splenic pole which may represent a pseudoaneurysm vs active site of bleeding. Moderate to large volume hemoperitoneum.     On exam she is tender to the left lower quadrant mostly. She is taking shallow breaths due to the pain.  She also complains of right shoulder pain to palpation.  No other gross abnormalities noted.     XR shoulder and humerus - pending     Labs reviewed.     A/P: 34 year old female s/p fall down stairs with grade 3-4 splenic laceration with active extravasation vs pseudoaneurysm.  -Admit to SICU   - Patient to get emergent IR embolization   -NPO, IVF  -2 units PRBC to be transfused for acute blood loss anemia secondary to traumatic splenic injury  -WIll monitor hypotension and response to blood transfusions with goal MAP > 65 mmHg   -Pain control   -Tertiary exam in the AM    CC time: 60 minutes

## 2019-11-11 NOTE — PROGRESS NOTE ADULT - SUBJECTIVE AND OBJECTIVE BOX
Interventional Radiology Brief- Operative Note    Procedure: Splenic embolization using glue    Operators: Dr. Huang; Dr. Garrett    Anesthesia (type): MAC    Contrast: 60 cc Omnipaque 240    EBL: 0 cc    Findings/Follow up Plan of Care: Large PSA arising from the splenic artery which was successfully embolized using glue.    Specimens Removed: None    Implants: Glue    Complications: None    Condition/Disposition: Stable/PACU    Please call Interventional Radiology x [phone] with any questions, concerns, or issues.

## 2019-11-11 NOTE — ED PROVIDER NOTE - MUSCULOSKELETAL MINIMAL EXAM
tenderness to palpation over right ribs, tenderness to palpation over superior aspect of right shoulder, right shoulder with full range of motion, palpable distal pulses, sensation intact

## 2019-11-11 NOTE — ED ADULT NURSE NOTE - ED STAT RN HANDOFF DETAILS
hand off given to oncoming RN Tolu Caballero. Awaiting urine sample for UCG. continues to c/o right rib pain with ROM and upon deep inspiration, less than earlier. Lungs clear. 1445 Ambulated to bathroom to void. A&Ox4

## 2019-11-12 LAB
ANION GAP SERPL CALC-SCNC: 10 MMOL/L — SIGNIFICANT CHANGE UP (ref 5–17)
BASE EXCESS BLDV CALC-SCNC: -0.4 MMOL/L — SIGNIFICANT CHANGE UP (ref -2–2)
BASE EXCESS BLDV CALC-SCNC: -0.7 MMOL/L — SIGNIFICANT CHANGE UP (ref -2–2)
BUN SERPL-MCNC: 17 MG/DL — SIGNIFICANT CHANGE UP (ref 7–23)
CA-I SERPL-SCNC: 1.17 MMOL/L — SIGNIFICANT CHANGE UP (ref 1.12–1.3)
CA-I SERPL-SCNC: 1.2 MMOL/L — SIGNIFICANT CHANGE UP (ref 1.12–1.3)
CALCIUM SERPL-MCNC: 8.7 MG/DL — SIGNIFICANT CHANGE UP (ref 8.4–10.5)
CHLORIDE BLDV-SCNC: 111 MMOL/L — HIGH (ref 96–108)
CHLORIDE BLDV-SCNC: 112 MMOL/L — HIGH (ref 96–108)
CHLORIDE SERPL-SCNC: 107 MMOL/L — SIGNIFICANT CHANGE UP (ref 96–108)
CO2 BLDV-SCNC: 25 MMOL/L — SIGNIFICANT CHANGE UP (ref 22–30)
CO2 BLDV-SCNC: 26 MMOL/L — SIGNIFICANT CHANGE UP (ref 22–30)
CO2 SERPL-SCNC: 22 MMOL/L — SIGNIFICANT CHANGE UP (ref 22–31)
CREAT SERPL-MCNC: 0.89 MG/DL — SIGNIFICANT CHANGE UP (ref 0.5–1.3)
GAS PNL BLDV: 137 MMOL/L — SIGNIFICANT CHANGE UP (ref 135–145)
GAS PNL BLDV: 138 MMOL/L — SIGNIFICANT CHANGE UP (ref 135–145)
GAS PNL BLDV: SIGNIFICANT CHANGE UP
GLUCOSE BLDV-MCNC: 103 MG/DL — HIGH (ref 70–99)
GLUCOSE BLDV-MCNC: 95 MG/DL — SIGNIFICANT CHANGE UP (ref 70–99)
GLUCOSE SERPL-MCNC: 104 MG/DL — HIGH (ref 70–99)
HBA1C BLD-MCNC: 5.4 % — SIGNIFICANT CHANGE UP (ref 4–5.6)
HCO3 BLDV-SCNC: 24 MMOL/L — SIGNIFICANT CHANGE UP (ref 21–29)
HCO3 BLDV-SCNC: 25 MMOL/L — SIGNIFICANT CHANGE UP (ref 21–29)
HCT VFR BLD CALC: 27.2 % — LOW (ref 34.5–45)
HCT VFR BLD CALC: 28.2 % — LOW (ref 34.5–45)
HCT VFR BLD CALC: 29.5 % — LOW (ref 34.5–45)
HCT VFR BLDA CALC: 29 % — LOW (ref 39–50)
HCT VFR BLDA CALC: 30 % — LOW (ref 39–50)
HGB BLD CALC-MCNC: 9.4 G/DL — LOW (ref 11.5–15.5)
HGB BLD CALC-MCNC: 9.8 G/DL — LOW (ref 11.5–15.5)
HGB BLD-MCNC: 9.1 G/DL — LOW (ref 11.5–15.5)
HGB BLD-MCNC: 9.2 G/DL — LOW (ref 11.5–15.5)
HGB BLD-MCNC: 9.6 G/DL — LOW (ref 11.5–15.5)
HOROWITZ INDEX BLDV+IHG-RTO: 32 — SIGNIFICANT CHANGE UP
HOROWITZ INDEX BLDV+IHG-RTO: 32 — SIGNIFICANT CHANGE UP
LACTATE BLDV-MCNC: 0.7 MMOL/L — SIGNIFICANT CHANGE UP (ref 0.7–2)
LACTATE BLDV-MCNC: 0.9 MMOL/L — SIGNIFICANT CHANGE UP (ref 0.7–2)
MAGNESIUM SERPL-MCNC: 1.9 MG/DL — SIGNIFICANT CHANGE UP (ref 1.6–2.6)
MCHC RBC-ENTMCNC: 29.5 PG — SIGNIFICANT CHANGE UP (ref 27–34)
MCHC RBC-ENTMCNC: 29.6 PG — SIGNIFICANT CHANGE UP (ref 27–34)
MCHC RBC-ENTMCNC: 30 PG — SIGNIFICANT CHANGE UP (ref 27–34)
MCHC RBC-ENTMCNC: 32.5 GM/DL — SIGNIFICANT CHANGE UP (ref 32–36)
MCHC RBC-ENTMCNC: 32.6 GM/DL — SIGNIFICANT CHANGE UP (ref 32–36)
MCHC RBC-ENTMCNC: 33.5 GM/DL — SIGNIFICANT CHANGE UP (ref 32–36)
MCV RBC AUTO: 89.8 FL — SIGNIFICANT CHANGE UP (ref 80–100)
MCV RBC AUTO: 90.4 FL — SIGNIFICANT CHANGE UP (ref 80–100)
MCV RBC AUTO: 91 FL — SIGNIFICANT CHANGE UP (ref 80–100)
NRBC # BLD: 0 /100 WBCS — SIGNIFICANT CHANGE UP (ref 0–0)
OTHER CELLS CSF MANUAL: 11 ML/DL — LOW (ref 18–22)
OTHER CELLS CSF MANUAL: 6 ML/DL — LOW (ref 18–22)
PCO2 BLDV: 40 MMHG — SIGNIFICANT CHANGE UP (ref 35–50)
PCO2 BLDV: 46 MMHG — SIGNIFICANT CHANGE UP (ref 35–50)
PH BLDV: 7.35 — SIGNIFICANT CHANGE UP (ref 7.35–7.45)
PH BLDV: 7.39 — SIGNIFICANT CHANGE UP (ref 7.35–7.45)
PHOSPHATE SERPL-MCNC: 4.6 MG/DL — HIGH (ref 2.5–4.5)
PLATELET # BLD AUTO: 156 K/UL — SIGNIFICANT CHANGE UP (ref 150–400)
PLATELET # BLD AUTO: 159 K/UL — SIGNIFICANT CHANGE UP (ref 150–400)
PLATELET # BLD AUTO: 169 K/UL — SIGNIFICANT CHANGE UP (ref 150–400)
PO2 BLDV: 32 MMHG — SIGNIFICANT CHANGE UP (ref 25–45)
PO2 BLDV: 52 MMHG — HIGH (ref 25–45)
POTASSIUM BLDV-SCNC: 4 MMOL/L — SIGNIFICANT CHANGE UP (ref 3.5–5.3)
POTASSIUM BLDV-SCNC: 4.1 MMOL/L — SIGNIFICANT CHANGE UP (ref 3.5–5.3)
POTASSIUM SERPL-MCNC: 4.1 MMOL/L — SIGNIFICANT CHANGE UP (ref 3.5–5.3)
POTASSIUM SERPL-SCNC: 4.1 MMOL/L — SIGNIFICANT CHANGE UP (ref 3.5–5.3)
RBC # BLD: 3.03 M/UL — LOW (ref 3.8–5.2)
RBC # BLD: 3.12 M/UL — LOW (ref 3.8–5.2)
RBC # BLD: 3.24 M/UL — LOW (ref 3.8–5.2)
RBC # FLD: 13.6 % — SIGNIFICANT CHANGE UP (ref 10.3–14.5)
RBC # FLD: 13.6 % — SIGNIFICANT CHANGE UP (ref 10.3–14.5)
RBC # FLD: 13.8 % — SIGNIFICANT CHANGE UP (ref 10.3–14.5)
SAO2 % BLDV: 47 % — LOW (ref 67–88)
SAO2 % BLDV: 85 % — SIGNIFICANT CHANGE UP (ref 67–88)
SODIUM SERPL-SCNC: 139 MMOL/L — SIGNIFICANT CHANGE UP (ref 135–145)
WBC # BLD: 12.45 K/UL — HIGH (ref 3.8–10.5)
WBC # BLD: 12.61 K/UL — HIGH (ref 3.8–10.5)
WBC # BLD: 13.18 K/UL — HIGH (ref 3.8–10.5)
WBC # FLD AUTO: 12.45 K/UL — HIGH (ref 3.8–10.5)
WBC # FLD AUTO: 12.61 K/UL — HIGH (ref 3.8–10.5)
WBC # FLD AUTO: 13.18 K/UL — HIGH (ref 3.8–10.5)

## 2019-11-12 PROCEDURE — 99231 SBSQ HOSP IP/OBS SF/LOW 25: CPT

## 2019-11-12 PROCEDURE — 99233 SBSQ HOSP IP/OBS HIGH 50: CPT

## 2019-11-12 RX ORDER — NICOTINE POLACRILEX 2 MG
1 GUM BUCCAL DAILY
Refills: 0 | Status: DISCONTINUED | OUTPATIENT
Start: 2019-11-12 | End: 2019-11-17

## 2019-11-12 RX ORDER — MAGNESIUM SULFATE 500 MG/ML
2 VIAL (ML) INJECTION ONCE
Refills: 0 | Status: COMPLETED | OUTPATIENT
Start: 2019-11-12 | End: 2019-11-12

## 2019-11-12 RX ORDER — HYDROMORPHONE HYDROCHLORIDE 2 MG/ML
0.5 INJECTION INTRAMUSCULAR; INTRAVENOUS; SUBCUTANEOUS
Refills: 0 | Status: DISCONTINUED | OUTPATIENT
Start: 2019-11-12 | End: 2019-11-14

## 2019-11-12 RX ORDER — HYDROMORPHONE HYDROCHLORIDE 2 MG/ML
0.5 INJECTION INTRAMUSCULAR; INTRAVENOUS; SUBCUTANEOUS ONCE
Refills: 0 | Status: DISCONTINUED | OUTPATIENT
Start: 2019-11-12 | End: 2019-11-12

## 2019-11-12 RX ORDER — NALOXONE HYDROCHLORIDE 4 MG/.1ML
0.1 SPRAY NASAL
Refills: 0 | Status: DISCONTINUED | OUTPATIENT
Start: 2019-11-12 | End: 2019-11-14

## 2019-11-12 RX ORDER — HYDROMORPHONE HYDROCHLORIDE 2 MG/ML
30 INJECTION INTRAMUSCULAR; INTRAVENOUS; SUBCUTANEOUS
Refills: 0 | Status: DISCONTINUED | OUTPATIENT
Start: 2019-11-12 | End: 2019-11-14

## 2019-11-12 RX ORDER — OXYCODONE HYDROCHLORIDE 5 MG/1
5 TABLET ORAL EVERY 4 HOURS
Refills: 0 | Status: DISCONTINUED | OUTPATIENT
Start: 2019-11-12 | End: 2019-11-12

## 2019-11-12 RX ORDER — OXYCODONE HYDROCHLORIDE 5 MG/1
10 TABLET ORAL EVERY 6 HOURS
Refills: 0 | Status: DISCONTINUED | OUTPATIENT
Start: 2019-11-12 | End: 2019-11-12

## 2019-11-12 RX ORDER — ONDANSETRON 8 MG/1
4 TABLET, FILM COATED ORAL EVERY 6 HOURS
Refills: 0 | Status: DISCONTINUED | OUTPATIENT
Start: 2019-11-12 | End: 2019-11-13

## 2019-11-12 RX ORDER — SENNA PLUS 8.6 MG/1
2 TABLET ORAL AT BEDTIME
Refills: 0 | Status: DISCONTINUED | OUTPATIENT
Start: 2019-11-12 | End: 2019-11-17

## 2019-11-12 RX ORDER — IPRATROPIUM/ALBUTEROL SULFATE 18-103MCG
3 AEROSOL WITH ADAPTER (GRAM) INHALATION ONCE
Refills: 0 | Status: COMPLETED | OUTPATIENT
Start: 2019-11-12 | End: 2019-11-12

## 2019-11-12 RX ADMIN — Medication 1 PATCH: at 14:26

## 2019-11-12 RX ADMIN — HYDROMORPHONE HYDROCHLORIDE 0.5 MILLIGRAM(S): 2 INJECTION INTRAMUSCULAR; INTRAVENOUS; SUBCUTANEOUS at 07:11

## 2019-11-12 RX ADMIN — SODIUM CHLORIDE 100 MILLILITER(S): 9 INJECTION, SOLUTION INTRAVENOUS at 07:55

## 2019-11-12 RX ADMIN — HYDROMORPHONE HYDROCHLORIDE 0.5 MILLIGRAM(S): 2 INJECTION INTRAMUSCULAR; INTRAVENOUS; SUBCUTANEOUS at 01:15

## 2019-11-12 RX ADMIN — HYDROMORPHONE HYDROCHLORIDE 0.5 MILLIGRAM(S): 2 INJECTION INTRAMUSCULAR; INTRAVENOUS; SUBCUTANEOUS at 03:50

## 2019-11-12 RX ADMIN — Medication 1 MILLIGRAM(S): at 17:21

## 2019-11-12 RX ADMIN — Medication 1000 MILLIGRAM(S): at 11:37

## 2019-11-12 RX ADMIN — HYDROMORPHONE HYDROCHLORIDE 0.5 MILLIGRAM(S): 2 INJECTION INTRAMUSCULAR; INTRAVENOUS; SUBCUTANEOUS at 22:15

## 2019-11-12 RX ADMIN — HYDROMORPHONE HYDROCHLORIDE 0.5 MILLIGRAM(S): 2 INJECTION INTRAMUSCULAR; INTRAVENOUS; SUBCUTANEOUS at 01:32

## 2019-11-12 RX ADMIN — OXYCODONE HYDROCHLORIDE 10 MILLIGRAM(S): 5 TABLET ORAL at 08:38

## 2019-11-12 RX ADMIN — Medication 400 MILLIGRAM(S): at 05:15

## 2019-11-12 RX ADMIN — HYDROMORPHONE HYDROCHLORIDE 0.5 MILLIGRAM(S): 2 INJECTION INTRAMUSCULAR; INTRAVENOUS; SUBCUTANEOUS at 06:56

## 2019-11-12 RX ADMIN — HYDROMORPHONE HYDROCHLORIDE 0.5 MILLIGRAM(S): 2 INJECTION INTRAMUSCULAR; INTRAVENOUS; SUBCUTANEOUS at 00:36

## 2019-11-12 RX ADMIN — HYDROMORPHONE HYDROCHLORIDE 30 MILLILITER(S): 2 INJECTION INTRAMUSCULAR; INTRAVENOUS; SUBCUTANEOUS at 19:06

## 2019-11-12 RX ADMIN — HYDROMORPHONE HYDROCHLORIDE 0.5 MILLIGRAM(S): 2 INJECTION INTRAMUSCULAR; INTRAVENOUS; SUBCUTANEOUS at 04:10

## 2019-11-12 RX ADMIN — OXYCODONE HYDROCHLORIDE 10 MILLIGRAM(S): 5 TABLET ORAL at 08:23

## 2019-11-12 RX ADMIN — HYDROMORPHONE HYDROCHLORIDE 0.5 MILLIGRAM(S): 2 INJECTION INTRAMUSCULAR; INTRAVENOUS; SUBCUTANEOUS at 14:24

## 2019-11-12 RX ADMIN — HYDROMORPHONE HYDROCHLORIDE 0.5 MILLIGRAM(S): 2 INJECTION INTRAMUSCULAR; INTRAVENOUS; SUBCUTANEOUS at 00:22

## 2019-11-12 RX ADMIN — Medication 1 PATCH: at 19:13

## 2019-11-12 RX ADMIN — HYDROMORPHONE HYDROCHLORIDE 0.5 MILLIGRAM(S): 2 INJECTION INTRAMUSCULAR; INTRAVENOUS; SUBCUTANEOUS at 07:55

## 2019-11-12 RX ADMIN — Medication 400 MILLIGRAM(S): at 11:07

## 2019-11-12 RX ADMIN — HYDROMORPHONE HYDROCHLORIDE 30 MILLILITER(S): 2 INJECTION INTRAMUSCULAR; INTRAVENOUS; SUBCUTANEOUS at 09:38

## 2019-11-12 RX ADMIN — Medication 3 MILLILITER(S): at 17:34

## 2019-11-12 RX ADMIN — HYDROMORPHONE HYDROCHLORIDE 0.5 MILLIGRAM(S): 2 INJECTION INTRAMUSCULAR; INTRAVENOUS; SUBCUTANEOUS at 09:39

## 2019-11-12 RX ADMIN — HYDROMORPHONE HYDROCHLORIDE 0.5 MILLIGRAM(S): 2 INJECTION INTRAMUSCULAR; INTRAVENOUS; SUBCUTANEOUS at 08:10

## 2019-11-12 RX ADMIN — ONDANSETRON 4 MILLIGRAM(S): 8 TABLET, FILM COATED ORAL at 17:22

## 2019-11-12 NOTE — PROGRESS NOTE ADULT - ASSESSMENT
A/P: 34y Female w/o significant PMHx a/f grade 3-4 splenic laceration after mechanical fall, now PPD#1 s/p Splenic embolization using glue with IR.      - Diet: NPO/IVF  - Activity: OOB as tolerated.   - Labs: f/u AM labs   - Pain medication: IV Tylenol, dilaudid prn   - Trend CBC  - Patient will need Vaccinations s/p splenic embolization  - Appreciate SICU excellent care      ACS [phone]

## 2019-11-12 NOTE — PROGRESS NOTE ADULT - ASSESSMENT
ASSESSMENT:  COLIN CHARLES is a 34y Female otherwise healthy who presents after a mechanical fall yesterday found to have grade III-IV splenic laceration and moderate-large hemoperitoneum, with plan for IR embolization.    PLAN:    NEURO: alert, oriented  - Pain control with standing tylenol and prn dilaudid    RESPIRATORY:   - No active issues    CARDIOVASCULAR: Initially hypotensive to systolic 90s (usually 120s) now improved s/p 2 units pRBCs  - Hemodynamic monitoring  - Trend H/H, lactate  - Bedrest in setting of active bleeding    GI/NUTRITION: mod-large volume hemoperitoneum  - NPO except meds, if stable advance diet in AM  - Serial abdominal exams    GENITOURINARY/RENAL:  - Monitor I/Os, electrolytes, Cr    HEMATOLOGIC:  - Trend H/H q4h, space out if stable  - Hold VTE ppx in setting of active bleeding    INFECTIOUS DISEASE:  - No active issues    ENDOCRINE:  - No active issues ASSESSMENT:  COLIN CHARLES is a 34y Female otherwise healthy who presents after a mechanical fall yesterday found to have grade III-IV splenic laceration and moderate-large hemoperitoneum now s/p glue embolization of splenic pseudoaneurysm    PLAN:    NEURO: alert, oriented  - Pain control with standing tylenol and prn dilaudid    RESPIRATORY:   - No active issues    CARDIOVASCULAR: Initially hypotensive to systolic 90s (usually 120s) now improved s/p 2 units pRBCs  - Hemodynamic monitoring  - Trend H/H, lactate  - Bedrest in setting of active bleeding    GI/NUTRITION: mod-large volume hemoperitoneum  - NPO except meds, if stable advance diet in AM  - Serial abdominal exams    GENITOURINARY/RENAL:  - Monitor I/Os, electrolytes, Cr    HEMATOLOGIC:  - Trend H/H q4h, space out if stable  - Hold VTE ppx in setting of active bleeding    INFECTIOUS DISEASE:  - No active issues    ENDOCRINE:  - No active issues ASSESSMENT:  COLIN CHARLES is a 34y Female otherwise healthy who presents after a mechanical fall yesterday found to have grade III-IV splenic laceration and moderate-large hemoperitoneum now s/p glue embolization of splenic pseudoaneurysm    PLAN:    NEURO: acute pain  - Pain control with standing tylenol and Dilaudid PCA    RESPIRATORY: atelectasis  - Incentive spirometry, OOB when pain controlled    CARDIOVASCULAR: Initially hypotensive to systolic 90s (usually 120s) now improved s/p 2 units pRBCs  - Hemodynamic monitoring  - Trend H/H, lactate  - Bedrest in setting of active bleeding    GI/NUTRITION: mod-large volume hemoperitoneum   - NPO except meds, if stable advance diet in AM  - Serial abdominal exams    GENITOURINARY/RENAL: no acute issues  - Monitor I/Os, electrolytes    HEMATOLOGIC: acute bleeding from splenic laceration s/p glue embolization of splenic artery pseudoaneurysm  - Trend H/H q6h  - Hold VTE ppx in setting of active bleeding    INFECTIOUS DISEASE: No active issues  - Culture if febrile    ENDOCRINE: No active issues  - Monitor glucose on BMP    DISPO: SICU ASSESSMENT:  COLIN CHARLES is a 34y Female otherwise healthy who presents after a mechanical fall yesterday found to have grade III-IV splenic laceration and moderate-large hemoperitoneum now s/p glue embolization of splenic pseudoaneurysm    PLAN:    NEURO: acute pain  - Pain control with standing tylenol and Dilaudid PCA    RESPIRATORY: atelectasis  - Incentive spirometry, OOB when pain controlled    CARDIOVASCULAR: Initially hypotensive to systolic 90s (usually 120s) now improved s/p 2 units pRBCs  - Hemodynamic monitoring  - Trend H/H, lactate  - Bedrest in setting of active bleeding    GI/NUTRITION: mod-large volume hemoperitoneum   - CLD  - Serial abdominal exams    GENITOURINARY/RENAL: no acute issues  - Monitor I/Os, electrolytes    HEMATOLOGIC: acute bleeding from splenic laceration s/p glue embolization of splenic artery pseudoaneurysm  - Trend H/H q6h  - Hold VTE ppx in setting of active bleeding    INFECTIOUS DISEASE: No active issues  - Culture if febrile    ENDOCRINE: No active issues  - Monitor glucose on BMP    DISPO: SICU

## 2019-11-12 NOTE — PROGRESS NOTE ADULT - SUBJECTIVE AND OBJECTIVE BOX
SICU DAILY PROGRESS NOTE    COLIN CHARLES is a 34y Female with no significant pmhx or pshx who presents to the ED after sustaining a fall down multiple steps yesterday. Patient sustained a mechanical fall while taking out the garbage yesterday afternoon, denies LOC or head impact and is not on any anticoagulation. Patient was in her usual state of health prior to the fall and denies any preceding dizziness, or blurry vision. Patient recalls the sequence of events and was able to ambulate after the fall by herself. She initially felt well but was experiencing worsening chest and abdominal pain, prompting her to call EMS to bring her to the ED for further evaluation.     Upon presentation her H/H was 9.4/28.3. A CT scan was obtained demonstrating grade 3 to 4 splenic injury characterized by multiple deep parenchymal lacerations measuring greater than 3 cm in depth, as well as an arterially enhancing site at the inferior splenic pole which may represent a pseudoaneurysm vs active site of bleeding along with moderate to large volume hemoperitoneum. A repeat H/H was 7.9/23.9. She was ordered 2 units of pRBCs and IR was consulted for embolization. SICU was consulted for hemodynamic monitoring.    24 HOUR EVENTS:  - s/p glue embolization of splenic pseudoaneurysm  - Received 2 units pRBCs with appropriate response    SUBJECTIVE/ROS:  [ ] A ten-point review of systems was otherwise negative except as noted.  [ ] Due to altered mental status/intubation, subjective information were not able to be obtained from the patient. History was obtained, to the extent possible, from review of the chart and collateral sources of information.      NEURO  RASS:     GCS:     [facility] ICU:  Exam: awake, alert, oriented  Meds: acetaminophen  IVPB .. 1000 milliGRAM(s) IV Intermittent every 6 hours  HYDROmorphone  Injectable 0.5 milliGRAM(s) IV Push every 3 hours PRN Severe Pain (7 - 10)    [x] Adequacy of sedation and pain control has been assessed and adjusted      RESPIRATORY  RR: 18 (11-12-19 @ 00:00) (16 - 38)  SpO2: 97% (11-12-19 @ 00:00) (84% - 98%)  Wt(kg): --  Exam: unlabored, equal chest rise  Mechanical Ventilation:   ABG - ( 11 Nov 2019 20:16 )  pH: 7.32  /  pCO2: 48    /  pO2: 81    / HCO3: 24    / Base Excess: -1.8  /  SaO2: 95      Lactate: x          [N/A] Extubation Readiness Assessed  Meds:       CARDIOVASCULAR  HR: 61 (11-12-19 @ 00:00) (61 - 110)  BP: 108/52 (11-12-19 @ 00:00) (90/55 - 129/71)  BP(mean): 75 (11-12-19 @ 00:00) (65 - 92)  ABP: --  ABP(mean): --  Wt(kg): --  CVP(cm H2O): --  VBG - ( 11 Nov 2019 21:40 )  pH: 7.34  /  pCO2: 43    /  pO2: 38    / HCO3: 22    / Base Excess: -2.6  /  SaO2: 60     Lactate: 1.4        Exam: regular rate and rhythm  Cardiac Rhythm: sinus  Perfusion     [x]Adequate   [ ]Inadequate  Mentation   [x]Normal       [ ]Reduced  Extremities  [x]Warm         [ ]Cool  Volume Status [ ]Hypervolemic [x]Euvolemic [ ]Hypovolemic  Meds:       GI/NUTRITION  Exam: soft, diffusely tender, mildly distended  Diet: NPO except meds  Meds:     GENITOURINARY  I&O's Detail    11-11 @ 07:01  -  11-12 @ 01:01  --------------------------------------------------------  IN:    lactated ringers.: 500 mL  Total IN: 500 mL    OUT:    Indwelling Catheter - Urethral: 200 mL  Total OUT: 200 mL    Total NET: 300 mL        Weight (kg): 95.3 (11-11 @ 09:41)  11-11    140  |  104  |  22  ----------------------------<  128<H>  4.8   |  23  |  1.16    Ca    8.8      11 Nov 2019 11:12    TPro  6.1  /  Alb  3.5  /  TBili  0.2  /  DBili  x   /  AST  26  /  ALT  19  /  AlkPhos  74  11-11    [ ] Kelly catheter, indication: N/A  Meds: lactated ringers. 1000 milliLiter(s) IV Continuous <Continuous>        HEMATOLOGIC  Meds:   [x] VTE Prophylaxis                        10.7   13.98 )-----------( 155      ( 11 Nov 2019 22:01 )             33.4     PT/INR - ( 11 Nov 2019 17:13 )   PT: 12.0 sec;   INR: 1.05 ratio         PTT - ( 11 Nov 2019 17:13 )  PTT:26.8 sec  Transfusion     [ ] PRBC   [ ] Platelets   [ ] FFP   [ ] Cryoprecipitate      INFECTIOUS DISEASES  WBC Count: 13.98 K/uL (11-11 @ 22:01)  WBC Count: 12.49 K/uL (11-11 @ 17:13)  WBC Count: 12.61 K/uL (11-11 @ 11:12)    RECENT CULTURES:    Meds:       ENDOCRINE  CAPILLARY BLOOD GLUCOSE        Meds:       ACCESS DEVICES:  [ ] Peripheral IV  [ ] Central Venous Line	[ ] R	[ ] L	[ ] IJ	[ ] Fem	[ ] SC	Placed:   [ ] Arterial Line		[ ] R	[ ] L	[ ] Fem	[ ] Rad	[ ] Ax	Placed:   [ ] PICC:					[ ] Mediport  [ ] Urinary Catheter, Date Placed:   [x] Necessity of urinary, arterial, and venous catheters discussed    OTHER MEDICATIONS:  chlorhexidine 2% Cloths 1 Application(s) Topical daily      CODE STATUS:      IMAGING: SICU DAILY PROGRESS NOTE    COLIN CHARLES is a 34y Female with no significant pmhx or pshx who presents to the ED after sustaining a fall down multiple steps yesterday. Patient sustained a mechanical fall while taking out the garbage yesterday afternoon, denies LOC or head impact and is not on any anticoagulation. Patient was in her usual state of health prior to the fall and denies any preceding dizziness, or blurry vision. Patient recalls the sequence of events and was able to ambulate after the fall by herself. She initially felt well but was experiencing worsening chest and abdominal pain, prompting her to call EMS to bring her to the ED for further evaluation.     Upon presentation her H/H was 9.4/28.3. A CT scan was obtained demonstrating grade 3 to 4 splenic injury characterized by multiple deep parenchymal lacerations measuring greater than 3 cm in depth, as well as an arterially enhancing site at the inferior splenic pole which may represent a pseudoaneurysm vs active site of bleeding along with moderate to large volume hemoperitoneum. A repeat H/H was 7.9/23.9. She was ordered 2 units of pRBCs and IR was consulted for embolization. SICU was consulted for hemodynamic monitoring.    24 HOUR EVENTS:  - s/p glue embolization of splenic pseudoaneurysm  - Received 2 units pRBCs with appropriate response    SUBJECTIVE/ROS:  [x ] A ten-point review of systems was otherwise negative except as noted.  [ ] Due to altered mental status/intubation, subjective information were not able to be obtained from the patient. History was obtained, to the extent possible, from review of the chart and collateral sources of information.      NEURO  Exam: awake, alert, oriented  Meds: acetaminophen  IVPB .. 1000 milliGRAM(s) IV Intermittent every 6 hours  HYDROmorphone  Injectable 0.5 milliGRAM(s) IV Push every 3 hours PRN Severe Pain (7 - 10)    [x] Adequacy of sedation and pain control has been assessed and adjusted      RESPIRATORY  RR: 18 (11-12-19 @ 00:00) (16 - 38)  SpO2: 97% (11-12-19 @ 00:00) (84% - 98%)  Exam: unlabored, equal chest rise  ABG - ( 11 Nov 2019 20:16 )  pH: 7.32  /  pCO2: 48    /  pO2: 81    / HCO3: 24    / Base Excess: -1.8  /  SaO2: 95      Blood Gas Arterial, Lactate: 0.7 mmol/L (11.11.19 @ 20:16)    [N/A] Extubation Readiness Assessed  Meds: x      CARDIOVASCULAR  HR: 61 (11-12-19 @ 00:00) (61 - 110)  BP: 108/52 (11-12-19 @ 00:00) (90/55 - 129/71)  BP(mean): 75 (11-12-19 @ 00:00) (65 - 92)  VBG - ( 11 Nov 2019 21:40 )  pH: 7.34  /  pCO2: 43    /  pO2: 38    / HCO3: 22    / Base Excess: -2.6  /  SaO2: 60     Lactate: 1.4        Exam: regular rate and rhythm  Cardiac Rhythm: sinus  Perfusion     [x]Adequate   [ ]Inadequate  Mentation   [x]Normal       [ ]Reduced  Extremities  [x]Warm         [ ]Cool  Volume Status [ ]Hypervolemic [x]Euvolemic [ ]Hypovolemic  Meds: x      GI/NUTRITION  Exam: soft, nondistended, diffusely tender  Diet: NPO except meds  Meds: x    GENITOURINARY  I&O's Detail    11-11 @ 07:01  -  11-12 @ 01:01  --------------------------------------------------------  IN:    lactated ringers.: 500 mL  Total IN: 500 mL    OUT:    Indwelling Catheter - Urethral: 200 mL  Total OUT: 200 mL    Total NET: 300 mL        Weight (kg): 95.3 (11-11 @ 09:41)  11-11    140  |  104  |  22  ----------------------------<  128<H>  4.8   |  23  |  1.16    Ca    8.8      11 Nov 2019 11:12    TPro  6.1  /  Alb  3.5  /  TBili  0.2  /  DBili  x   /  AST  26  /  ALT  19  /  AlkPhos  74  11-11    [ x] Kelly catheter  Meds: lactated ringers. 1000 milliLiter(s) IV Continuous <Continuous>        HEMATOLOGIC  Meds: x  [n/a] VTE Prophylaxis                        10.7   13.98 )-----------( 155      ( 11 Nov 2019 22:01 )             33.4     PT/INR - ( 11 Nov 2019 17:13 )   PT: 12.0 sec;   INR: 1.05 ratio         PTT - ( 11 Nov 2019 17:13 )  PTT:26.8 sec  Transfusion     [2 ] PRBC   [ ] Platelets   [ ] FFP   [ ] Cryoprecipitate      INFECTIOUS DISEASES  WBC Count: 13.98 K/uL (11-11 @ 22:01)  WBC Count: 12.49 K/uL (11-11 @ 17:13)  WBC Count: 12.61 K/uL (11-11 @ 11:12)    RECENT CULTURES: x    Meds:  x      ENDOCRINE  CAPILLARY BLOOD GLUCOSE  x        Meds: x      ACCESS DEVICES:  [x ] Peripheral IV  [ ] Central Venous Line	[ ] R	[ ] L	[ ] IJ	[ ] Fem	[ ] SC	Placed:   [ ] Arterial Line		[ ] R	[ ] L	[ ] Fem	[ ] Rad	[ ] Ax	Placed:   [ ] PICC:					[ ] Mediport  [ x] Urinary Catheter, Date Placed: 11/11  [x] Necessity of urinary, arterial, and venous catheters discussed    OTHER MEDICATIONS:  chlorhexidine 2% Cloths 1 Application(s) Topical daily      CODE STATUS: full code      IMAGING: < from: CT Abdomen and Pelvis w/ IV Cont (11.11.19 @ 16:23) >  IMPRESSION:     Grade 3 to 4 splenic injury characterized by multiple deep parenchymal   lacerations measuring greater than 3 cm in depth, as well as an   arterially enhancing site at the inferior splenic pole which may   represent a pseudoaneurysm vs active site of bleeding.    Moderate to large volume hemoperitoneum.    < end of copied text >

## 2019-11-12 NOTE — PROGRESS NOTE ADULT - SUBJECTIVE AND OBJECTIVE BOX
Called to evaluate 34-year-old female S\P mechanical fall with splenic laceration for PCA.  S\P IR embolization. Medical record reviewed. NKDA.   Endorsing abdominal pain unrelieved with IV Dilaudid. Denies chronic opiate use.   Will initiate PCA Dilaudid 0.2mg / 6 minutes / no basal / 4mg four hour limit.  Acute pain service to follow.

## 2019-11-12 NOTE — PROGRESS NOTE ADULT - SUBJECTIVE AND OBJECTIVE BOX
Interventional Radiology Follow- Up Note      This is a 34y Female s/p splenic angiogram and embolization on 11/11 in Interventional Radiology with Dr. Huang.     Patient seen and examined @ bedside. Patient complains of generalized abdominal pain and mild nausea without vomiting. Patient denies fever, chills, altered mental status.    Vitals: T(F): 98.6 (11-12-19 @ 11:00), Max: 98.6 (11-12-19 @ 11:00)  HR: 66 (11-12-19 @ 11:00) (59 - 100)  BP: 129/59 (11-12-19 @ 11:00) (90/55 - 136/63)  RR: 18 (11-12-19 @ 11:00) (16 - 38)  SpO2: 95% (11-12-19 @ 11:00) (84% - 98%)      LABS:                        9.1    12.61 )-----------( 156      ( 12 Nov 2019 06:27 )             27.2     11-12    139  |  107  |  17  ----------------------------<  104<H>  4.1   |  22  |  0.89    Ca    8.7      12 Nov 2019 03:31  Phos  4.6     11-12  Mg     1.9     11-12    TPro  6.1  /  Alb  3.5  /  TBili  0.2  /  DBili  x   /  AST  26  /  ALT  19  /  AlkPhos  74  11-11    PT/INR - ( 11 Nov 2019 17:13 )   PT: 12.0 sec;   INR: 1.05 ratio         PTT - ( 11 Nov 2019 17:13 )  PTT:26.8 sec    Antibiotics: n/a    PHYSICAL EXAM:  General: Nontoxic, in NAD, A&O x3  Abdomen: soft, generalized abdominal pain  Extremities:  right groin clean, dry and intact, soft with no evidence of hematoma, right femoral/dp/pt pulses +2. No pedal edema or calf tenderness noted.      Impression: 35yo female w/o significant PMHx referred to IR after sustaining a mechanical fall and developing grade 3-4 splenic laceration now s/p Splenic embolization using glue day #1.      Assessment/Plan:    Patient is doing well s/p Splenic embolization using glue day #1.     -encourage ambulation   -advance diet as tolerated  -monitor h/h; transfuse as needed  -will discuss with IR attending     Please call IR at extension [phone] with any questions, concerns, or issues regarding above.        GRICEL Saavedra-BC  Spectra # [id number]

## 2019-11-12 NOTE — PROGRESS NOTE ADULT - SUBJECTIVE AND OBJECTIVE BOX
ACS SURGERY DAILY PROGRESS NOTE:       SUBJECTIVE/ROS: Patient seen and examined at bedside.  Patient did not sleep well overnight and is tired this morning.  She complains of some abdominal pain unchanged since her procedure.    - s/p glue embolization of splenic pseudoaneurysm with IR  - Received 2 units pRBCs with appropriate response         MEDICATIONS  (STANDING):  acetaminophen  IVPB .. 1000 milliGRAM(s) IV Intermittent every 6 hours  chlorhexidine 2% Cloths 1 Application(s) Topical daily  lactated ringers. 1000 milliLiter(s) (100 mL/Hr) IV Continuous <Continuous>    MEDICATIONS  (PRN):  HYDROmorphone  Injectable 0.5 milliGRAM(s) IV Push every 3 hours PRN Severe Pain (7 - 10)      OBJECTIVE:    Vital Signs Last 24 Hrs  T(C): 36.8 (2019 03:00), Max: 36.8 (2019 09:41)  T(F): 98.2 (2019 03:00), Max: 98.2 (2019 09:41)  HR: 61 (2019 07:00) (60 - 110)  BP: 136/63 (2019 07:00) (90/55 - 136/63)  BP(mean): 91 (2019 07:00) (65 - 92)  RR: 17 (2019 07:00) (16 - 38)  SpO2: 97% (2019 07:00) (84% - 98%)        I&O's Detail    2019 07:01  -  2019 07:00  --------------------------------------------------------  IN:    IV PiggyBack: 200 mL    lactated ringers.: 1100 mL    Oral Fluid: 50 mL  Total IN: 1350 mL    OUT:    Indwelling Catheter - Urethral: 645 mL  Total OUT: 645 mL    Total NET: 705 mL          Daily Height in cm: 160.02 (2019 09:41)    Daily Weight in k.1 (2019 21:00)    LABS:                        9.1    12.61 )-----------( 156      ( 2019 06:27 )             27.2     11-12    139  |  107  |  17  ----------------------------<  104<H>  4.1   |  22  |  0.89    Ca    8.7      2019 03:31  Phos  4.6     11-12  Mg     1.9         TPro  6.1  /  Alb  3.5  /  TBili  0.2  /  DBili  x   /  AST  26  /  ALT  19  /  AlkPhos  74  11-11    PT/INR - ( 2019 17:13 )   PT: 12.0 sec;   INR: 1.05 ratio         PTT - ( 2019 17:13 )  PTT:26.8 sec              PHYSICAL EXAM:  Constitutional: NAD, sleeping comfortably, alert  Respiratory: non-labor breathing   Cardiovascular: RRR  Gastrointestinal: abd distended, moderate TTP, no guarding.  Extremities: right groin access site with dressing in place, no strikethrough bleeding or induration/hematoma.

## 2019-11-12 NOTE — PROGRESS NOTE ADULT - SUBJECTIVE AND OBJECTIVE BOX
ACS Post-op Note   STATUS POST:  Splenic embolization using glue by IR  POST OPERATIVE DAY #: 0    pt seen and examined at bedside. c/o upper abd pain. denies dizziness, SOB, CP or palpitations.     Vital Signs Last 24 Hrs  T(C): 36.8 (12 Nov 2019 03:00), Max: 36.8 (11 Nov 2019 09:41)  T(F): 98.2 (12 Nov 2019 03:00), Max: 98.2 (11 Nov 2019 09:41)  HR: 86 (12 Nov 2019 04:00) (60 - 110)  BP: 122/65 (12 Nov 2019 04:00) (90/55 - 129/71)  BP(mean): 87 (12 Nov 2019 04:00) (65 - 92)  RR: 28 (12 Nov 2019 04:00) (16 - 38)  SpO2: 94% (12 Nov 2019 04:00) (84% - 98%)  I&O's Summary    11 Nov 2019 07:01  -  12 Nov 2019 04:50  --------------------------------------------------------  IN: 800 mL / OUT: 470 mL / NET: 330 mL      I&O's Detail    11 Nov 2019 07:01  -  12 Nov 2019 04:50  --------------------------------------------------------  IN:    lactated ringers.: 800 mL  Total IN: 800 mL    OUT:    Indwelling Catheter - Urethral: 470 mL  Total OUT: 470 mL    Total NET: 330 mL          MEDICATIONS  (STANDING):  acetaminophen  IVPB .. 1000 milliGRAM(s) IV Intermittent every 6 hours  chlorhexidine 2% Cloths 1 Application(s) Topical daily  lactated ringers. 1000 milliLiter(s) (100 mL/Hr) IV Continuous <Continuous>  magnesium sulfate  IVPB 2 Gram(s) IV Intermittent once    MEDICATIONS  (PRN):  HYDROmorphone  Injectable 0.5 milliGRAM(s) IV Push every 3 hours PRN Severe Pain (7 - 10)      LABS:                        9.6    12.45 )-----------( 169      ( 12 Nov 2019 03:31 )             29.5     11-12    139  |  107  |  17  ----------------------------<  104<H>  4.1   |  22  |  0.89    Ca    8.7      12 Nov 2019 03:31  Phos  4.6     11-12  Mg     1.9     11-12    TPro  6.1  /  Alb  3.5  /  TBili  0.2  /  DBili  x   /  AST  26  /  ALT  19  /  AlkPhos  74  11-11    PT/INR - ( 11 Nov 2019 17:13 )   PT: 12.0 sec;   INR: 1.05 ratio         PTT - ( 11 Nov 2019 17:13 )  PTT:26.8 sec      RADIOLOGY & ADDITIONAL STUDIES:    PHYSICAL EXAM:      Constitutional: NAD, sleeping comfortably, alert  Respiratory: non-labor breathing   Cardiovascular: RRR  Gastrointestinal: abd soft, moderate TTP at upper abd   Extremities: right groin access site with dressing in place, no strikethrough bleeding or induration/hematoma.   Vascular: + right femoral pulse.       A/P: 34y Female w/o significant PMHx a/f grade 3-4 splenic laceration after mechanical fall, now s/p Splenic embolization using glue POD#0 stable     - Diet: NPO  - Activity: OOB as tolerated.   - Labs: f/u AM labs   - Pain medication: IV Tylenol, dilaudid prn   - Appreciate SICU excellent care    ACS  [id number]

## 2019-11-13 LAB
ANION GAP SERPL CALC-SCNC: 12 MMOL/L — SIGNIFICANT CHANGE UP (ref 5–17)
BUN SERPL-MCNC: 9 MG/DL — SIGNIFICANT CHANGE UP (ref 7–23)
CALCIUM SERPL-MCNC: 8.4 MG/DL — SIGNIFICANT CHANGE UP (ref 8.4–10.5)
CHLORIDE SERPL-SCNC: 105 MMOL/L — SIGNIFICANT CHANGE UP (ref 96–108)
CO2 SERPL-SCNC: 23 MMOL/L — SIGNIFICANT CHANGE UP (ref 22–31)
CREAT SERPL-MCNC: 0.67 MG/DL — SIGNIFICANT CHANGE UP (ref 0.5–1.3)
GLUCOSE SERPL-MCNC: 100 MG/DL — HIGH (ref 70–99)
HCT VFR BLD CALC: 25.7 % — LOW (ref 34.5–45)
HCT VFR BLD CALC: 28.8 % — LOW (ref 34.5–45)
HGB BLD-MCNC: 8.4 G/DL — LOW (ref 11.5–15.5)
HGB BLD-MCNC: 9.3 G/DL — LOW (ref 11.5–15.5)
MAGNESIUM SERPL-MCNC: 1.8 MG/DL — SIGNIFICANT CHANGE UP (ref 1.6–2.6)
MCHC RBC-ENTMCNC: 29.4 PG — SIGNIFICANT CHANGE UP (ref 27–34)
MCHC RBC-ENTMCNC: 29.4 PG — SIGNIFICANT CHANGE UP (ref 27–34)
MCHC RBC-ENTMCNC: 32.3 GM/DL — SIGNIFICANT CHANGE UP (ref 32–36)
MCHC RBC-ENTMCNC: 32.7 GM/DL — SIGNIFICANT CHANGE UP (ref 32–36)
MCV RBC AUTO: 89.9 FL — SIGNIFICANT CHANGE UP (ref 80–100)
MCV RBC AUTO: 91.1 FL — SIGNIFICANT CHANGE UP (ref 80–100)
NRBC # BLD: 0 /100 WBCS — SIGNIFICANT CHANGE UP (ref 0–0)
NRBC # BLD: 0 /100 WBCS — SIGNIFICANT CHANGE UP (ref 0–0)
PHOSPHATE SERPL-MCNC: 3.3 MG/DL — SIGNIFICANT CHANGE UP (ref 2.5–4.5)
PLATELET # BLD AUTO: 165 K/UL — SIGNIFICANT CHANGE UP (ref 150–400)
PLATELET # BLD AUTO: 180 K/UL — SIGNIFICANT CHANGE UP (ref 150–400)
POTASSIUM SERPL-MCNC: 4.1 MMOL/L — SIGNIFICANT CHANGE UP (ref 3.5–5.3)
POTASSIUM SERPL-SCNC: 4.1 MMOL/L — SIGNIFICANT CHANGE UP (ref 3.5–5.3)
PROCALCITONIN SERPL-MCNC: 0.08 NG/ML — SIGNIFICANT CHANGE UP (ref 0.02–0.1)
RBC # BLD: 2.86 M/UL — LOW (ref 3.8–5.2)
RBC # BLD: 3.16 M/UL — LOW (ref 3.8–5.2)
RBC # FLD: 13.4 % — SIGNIFICANT CHANGE UP (ref 10.3–14.5)
RBC # FLD: 13.5 % — SIGNIFICANT CHANGE UP (ref 10.3–14.5)
SODIUM SERPL-SCNC: 140 MMOL/L — SIGNIFICANT CHANGE UP (ref 135–145)
WBC # BLD: 11.32 K/UL — HIGH (ref 3.8–10.5)
WBC # BLD: 11.37 K/UL — HIGH (ref 3.8–10.5)
WBC # FLD AUTO: 11.32 K/UL — HIGH (ref 3.8–10.5)
WBC # FLD AUTO: 11.37 K/UL — HIGH (ref 3.8–10.5)

## 2019-11-13 PROCEDURE — 73060 X-RAY EXAM OF HUMERUS: CPT | Mod: 26,RT

## 2019-11-13 PROCEDURE — 99232 SBSQ HOSP IP/OBS MODERATE 35: CPT

## 2019-11-13 RX ORDER — ACETAMINOPHEN 500 MG
975 TABLET ORAL EVERY 6 HOURS
Refills: 0 | Status: DISCONTINUED | OUTPATIENT
Start: 2019-11-13 | End: 2019-11-14

## 2019-11-13 RX ORDER — MAGNESIUM SULFATE 500 MG/ML
2 VIAL (ML) INJECTION ONCE
Refills: 0 | Status: COMPLETED | OUTPATIENT
Start: 2019-11-13 | End: 2019-11-13

## 2019-11-13 RX ORDER — ONDANSETRON 8 MG/1
4 TABLET, FILM COATED ORAL EVERY 6 HOURS
Refills: 0 | Status: DISCONTINUED | OUTPATIENT
Start: 2019-11-13 | End: 2019-11-14

## 2019-11-13 RX ORDER — SODIUM CHLORIDE 9 MG/ML
1000 INJECTION, SOLUTION INTRAVENOUS
Refills: 0 | Status: DISCONTINUED | OUTPATIENT
Start: 2019-11-13 | End: 2019-11-14

## 2019-11-13 RX ORDER — ACETAMINOPHEN 500 MG
1000 TABLET ORAL ONCE
Refills: 0 | Status: COMPLETED | OUTPATIENT
Start: 2019-11-13 | End: 2019-11-13

## 2019-11-13 RX ORDER — ACETAMINOPHEN 500 MG
100 TABLET ORAL ONCE
Refills: 0 | Status: DISCONTINUED | OUTPATIENT
Start: 2019-11-13 | End: 2019-11-13

## 2019-11-13 RX ORDER — SUMATRIPTAN SUCCINATE 4 MG/.5ML
100 INJECTION, SOLUTION SUBCUTANEOUS ONCE
Refills: 0 | Status: COMPLETED | OUTPATIENT
Start: 2019-11-13 | End: 2019-11-13

## 2019-11-13 RX ORDER — SODIUM CHLORIDE 9 MG/ML
1000 INJECTION, SOLUTION INTRAVENOUS
Refills: 0 | Status: DISCONTINUED | OUTPATIENT
Start: 2019-11-13 | End: 2019-11-13

## 2019-11-13 RX ADMIN — Medication 1 PATCH: at 12:03

## 2019-11-13 RX ADMIN — SUMATRIPTAN SUCCINATE 100 MILLIGRAM(S): 4 INJECTION, SOLUTION SUBCUTANEOUS at 15:00

## 2019-11-13 RX ADMIN — SODIUM CHLORIDE 50 MILLILITER(S): 9 INJECTION, SOLUTION INTRAVENOUS at 04:42

## 2019-11-13 RX ADMIN — ONDANSETRON 4 MILLIGRAM(S): 8 TABLET, FILM COATED ORAL at 22:15

## 2019-11-13 RX ADMIN — HYDROMORPHONE HYDROCHLORIDE 0.5 MILLIGRAM(S): 2 INJECTION INTRAMUSCULAR; INTRAVENOUS; SUBCUTANEOUS at 20:31

## 2019-11-13 RX ADMIN — HYDROMORPHONE HYDROCHLORIDE 30 MILLILITER(S): 2 INJECTION INTRAMUSCULAR; INTRAVENOUS; SUBCUTANEOUS at 20:54

## 2019-11-13 RX ADMIN — SUMATRIPTAN SUCCINATE 100 MILLIGRAM(S): 4 INJECTION, SOLUTION SUBCUTANEOUS at 14:30

## 2019-11-13 RX ADMIN — ONDANSETRON 4 MILLIGRAM(S): 8 TABLET, FILM COATED ORAL at 17:31

## 2019-11-13 RX ADMIN — Medication 1 PATCH: at 19:32

## 2019-11-13 RX ADMIN — Medication 1000 MILLIGRAM(S): at 05:15

## 2019-11-13 RX ADMIN — HYDROMORPHONE HYDROCHLORIDE 30 MILLILITER(S): 2 INJECTION INTRAMUSCULAR; INTRAVENOUS; SUBCUTANEOUS at 20:28

## 2019-11-13 RX ADMIN — CHLORHEXIDINE GLUCONATE 1 APPLICATION(S): 213 SOLUTION TOPICAL at 06:14

## 2019-11-13 RX ADMIN — HYDROMORPHONE HYDROCHLORIDE 30 MILLILITER(S): 2 INJECTION INTRAMUSCULAR; INTRAVENOUS; SUBCUTANEOUS at 19:08

## 2019-11-13 RX ADMIN — Medication 975 MILLIGRAM(S): at 20:05

## 2019-11-13 RX ADMIN — SENNA PLUS 2 TABLET(S): 8.6 TABLET ORAL at 22:43

## 2019-11-13 RX ADMIN — Medication 975 MILLIGRAM(S): at 10:30

## 2019-11-13 RX ADMIN — ONDANSETRON 4 MILLIGRAM(S): 8 TABLET, FILM COATED ORAL at 10:33

## 2019-11-13 RX ADMIN — Medication 400 MILLIGRAM(S): at 04:42

## 2019-11-13 RX ADMIN — Medication 50 GRAM(S): at 03:14

## 2019-11-13 RX ADMIN — Medication 1 PATCH: at 07:49

## 2019-11-13 RX ADMIN — Medication 975 MILLIGRAM(S): at 19:35

## 2019-11-13 RX ADMIN — HYDROMORPHONE HYDROCHLORIDE 0.5 MILLIGRAM(S): 2 INJECTION INTRAMUSCULAR; INTRAVENOUS; SUBCUTANEOUS at 16:54

## 2019-11-13 RX ADMIN — Medication 1 PATCH: at 12:01

## 2019-11-13 RX ADMIN — HYDROMORPHONE HYDROCHLORIDE 0.5 MILLIGRAM(S): 2 INJECTION INTRAMUSCULAR; INTRAVENOUS; SUBCUTANEOUS at 03:20

## 2019-11-13 RX ADMIN — Medication 975 MILLIGRAM(S): at 11:00

## 2019-11-13 NOTE — PROGRESS NOTE ADULT - SUBJECTIVE AND OBJECTIVE BOX
HISTORY  COLIN CHARLES is a 34y Female with no significant pmhx or pshx who presents to the ED after sustaining a fall down multiple steps yesterday. Patient sustained a mechanical fall while taking out the garbage yesterday afternoon, denies LOC or head impact and is not on any anticoagulation. Patient was in her usual state of health prior to the fall and denies any preceding dizziness, or blurry vision. Patient recalls the sequence of events and was able to ambulate after the fall by herself. She initially felt well but was experiencing worsening chest and abdominal pain, prompting her to call EMS to bring her to the ED for further evaluation.     Upon presentation her H/H was 9.4/28.3. A CT scan was obtained demonstrating grade 3 to 4 splenic injury characterized by multiple deep parenchymal lacerations measuring greater than 3 cm in depth, as well as an arterially enhancing site at the inferior splenic pole which may represent a pseudoaneurysm vs active site of bleeding along with moderate to large volume hemoperitoneum. A repeat H/H was 7.9/23.9. She was ordered 2 units of pRBCs and IR was consulted for embolization. SICU was consulted for hemodynamic monitoring.    24 HOUR EVENTS:  -hall discontinued  -placed on dilaudid PCA for pain control  -given 1mg ativan for agitation  -trending q12h H/H    SUBJECTIVE/ROS:  [x] A ten-point review of systems was otherwise negative except as noted.  [ ] Due to altered mental status/intubation, subjective information were not able to be obtained from the patient. History was obtained, to the extent possible, from review of the chart and collateral sources of information.      NEURO  RASS:     GCS:     CAM ICU:  Exam: awake, alert, oriented  Meds: HYDROmorphone PCA (1 mG/mL) 30 milliLiter(s) PCA Continuous PCA Continuous  HYDROmorphone PCA (1 mG/mL) Rescue Clinician Bolus 0.5 milliGRAM(s) IV Push every 15 minutes PRN for Pain Scale GREATER THAN 6  ondansetron Injectable 4 milliGRAM(s) IV Push every 6 hours PRN Nausea    [x] Adequacy of sedation and pain control has been assessed and adjusted      RESPIRATORY  RR: 20 (11-12-19 @ 23:00) (16 - 34)  SpO2: 92% (11-12-19 @ 23:00) (90% - 99%)  Wt(kg): --  Exam: unlabored, clear to auscultation bilaterally  Mechanical Ventilation:   ABG - ( 11 Nov 2019 20:16 )  pH: 7.32  /  pCO2: 48    /  pO2: 81    / HCO3: 24    / Base Excess: -1.8  /  SaO2: 95      Lactate: x                [N/A] Extubation Readiness Assessed  Meds:       CARDIOVASCULAR  HR: 105 (11-12-19 @ 23:00) (59 - 117)  BP: 117/61 (11-12-19 @ 23:00) (98/53 - 136/63)  BP(mean): 83 (11-12-19 @ 23:00) (74 - 103)  ABP: --  ABP(mean): --  Wt(kg): --  CVP(cm H2O): --  VBG - ( 12 Nov 2019 06:27 )  pH: 7.39  /  pCO2: 40    /  pO2: 52    / HCO3: 24    / Base Excess: -0.7  /  SaO2: 85     Lactate: 0.7                Exam: regular rate and rhythm  Cardiac Rhythm: sinus  Perfusion     [x]Adequate   [ ]Inadequate  Mentation   [x]Normal       [ ]Reduced  Extremities  [x]Warm         [ ]Cool  Volume Status [ ]Hypervolemic [x]Euvolemic [ ]Hypovolemic  Meds:       GI/NUTRITION  Exam: soft, nontender, nondistended, incision C/D/I  Diet: Clears  Meds: senna 2 Tablet(s) Oral at bedtime      GENITOURINARY  I&O's Detail    11-11 @ 07:01 - 11-12 @ 07:00  --------------------------------------------------------  IN:    IV PiggyBack: 200 mL    lactated ringers.: 1100 mL    Oral Fluid: 50 mL  Total IN: 1350 mL    OUT:    Indwelling Catheter - Urethral: 645 mL  Total OUT: 645 mL    Total NET: 705 mL      11-12 @ 07:01  -  11-13 @ 00:02  --------------------------------------------------------  IN:    IV PiggyBack: 100 mL    lactated ringers.: 1200 mL  Total IN: 1300 mL    OUT:    Indwelling Catheter - Urethral: 175 mL    Voided: 800 mL  Total OUT: 975 mL    Total NET: 325 mL          11-12    139  |  107  |  17  ----------------------------<  104<H>  4.1   |  22  |  0.89    Ca    8.7      12 Nov 2019 03:31  Phos  4.6     11-12  Mg     1.9     11-12    TPro  6.1  /  Alb  3.5  /  TBili  0.2  /  DBili  x   /  AST  26  /  ALT  19  /  AlkPhos  74  11-11    [ ] Hall catheter, indication: N/A  Meds: lactated ringers. 1000 milliLiter(s) IV Continuous <Continuous>        HEMATOLOGIC  Meds:   [x] VTE Prophylaxis                        9.2    13.18 )-----------( 159      ( 12 Nov 2019 13:09 )             28.2     PT/INR - ( 11 Nov 2019 17:13 )   PT: 12.0 sec;   INR: 1.05 ratio         PTT - ( 11 Nov 2019 17:13 )  PTT:26.8 sec  Transfusion     [ ] PRBC   [ ] Platelets   [ ] FFP   [ ] Cryoprecipitate      INFECTIOUS DISEASES  WBC Count: 13.18 K/uL (11-12 @ 13:09)  WBC Count: 12.61 K/uL (11-12 @ 06:27)  WBC Count: 12.45 K/uL (11-12 @ 03:31)    RECENT CULTURES:    Meds:       ENDOCRINE  CAPILLARY BLOOD GLUCOSE        Meds:       ACCESS DEVICES:  [ ] Peripheral IV  [ ] Central Venous Line	[ ] R	[ ] L	[ ] IJ	[ ] Fem	[ ] SC	Placed:   [ ] Arterial Line		[ ] R	[ ] L	[ ] Fem	[ ] Rad	[ ] Ax	Placed:   [ ] PICC:					[ ] Mediport  [ ] Urinary Catheter, Date Placed:   [x] Necessity of urinary, arterial, and venous catheters discussed    OTHER MEDICATIONS:  chlorhexidine 2% Cloths 1 Application(s) Topical daily  naloxone Injectable 0.1 milliGRAM(s) IV Push every 3 minutes PRN  nicotine - 21 mG/24Hr(s) Patch 1 patch Transdermal daily      CODE STATUS:      IMAGING: HISTORY  COLIN CHARLES is a 34y Female with no significant pmhx or pshx who presents to the ED after sustaining a fall down multiple steps yesterday. Patient sustained a mechanical fall while taking out the garbage yesterday afternoon, denies LOC or head impact and is not on any anticoagulation. Patient was in her usual state of health prior to the fall and denies any preceding dizziness, or blurry vision. Patient recalls the sequence of events and was able to ambulate after the fall by herself. She initially felt well but was experiencing worsening chest and abdominal pain, prompting her to call EMS to bring her to the ED for further evaluation.     Upon presentation her H/H was 9.4/28.3. A CT scan was obtained demonstrating grade 3 to 4 splenic injury characterized by multiple deep parenchymal lacerations measuring greater than 3 cm in depth, as well as an arterially enhancing site at the inferior splenic pole which may represent a pseudoaneurysm vs active site of bleeding along with moderate to large volume hemoperitoneum. A repeat H/H was 7.9/23.9. She was ordered 2 units of pRBCs and IR was consulted for embolization. SICU was consulted for hemodynamic monitoring.    24 HOUR EVENTS:  -hall discontinued  -placed on dilaudid PCA for pain control  -given 1mg ativan for agitation  -trending q12h H/H    SUBJECTIVE/ROS:  [x] A ten-point review of systems was otherwise negative except as noted.  [ ] Due to altered mental status/intubation, subjective information were not able to be obtained from the patient. History was obtained, to the extent possible, from review of the chart and collateral sources of information.      NEURO  Exam: awake, alert, oriented  Meds: HYDROmorphone PCA (1 mG/mL) 30 milliLiter(s) PCA Continuous PCA Continuous  HYDROmorphone PCA (1 mG/mL) Rescue Clinician Bolus 0.5 milliGRAM(s) IV Push every 15 minutes PRN for Pain Scale GREATER THAN 6  ondansetron Injectable 4 milliGRAM(s) IV Push every 6 hours PRN Nausea    [x] Adequacy of sedation and pain control has been assessed and adjusted      RESPIRATORY  RR: 20 (11-12-19 @ 23:00) (16 - 34)  SpO2: 92% (11-12-19 @ 23:00) (90% - 99%)  Wt(kg): --  Exam: unlabored, clear to auscultation bilaterally  ABG - ( 11 Nov 2019 20:16 )  pH: 7.32  /  pCO2: 48    /  pO2: 81    / HCO3: 24    / Base Excess: -1.8  /  SaO2: 95      Lactate: x          CARDIOVASCULAR  HR: 105 (11-12-19 @ 23:00) (59 - 117)  BP: 117/61 (11-12-19 @ 23:00) (98/53 - 136/63)  BP(mean): 83 (11-12-19 @ 23:00) (74 - 103)  ABP: --  ABP(mean): --  Wt(kg): --  CVP(cm H2O): --  VBG - ( 12 Nov 2019 06:27 )  pH: 7.39  /  pCO2: 40    /  pO2: 52    / HCO3: 24    / Base Excess: -0.7  /  SaO2: 85     Lactate: 0.7      Exam: regular rate and rhythm  Cardiac Rhythm: sinus  Perfusion     [x]Adequate   [ ]Inadequate  Mentation   [x]Normal       [ ]Reduced  Extremities  [x]Warm         [ ]Cool  Volume Status [ ]Hypervolemic [x]Euvolemic [ ]Hypovolemic      GI/NUTRITION  Exam: soft, nontender, nondistended, incision C/D/I  Diet: Clears  Meds: senna 2 Tablet(s) Oral at bedtime      GENITOURINARY  I&O's Detail    11-11 @ 07:01  -  11-12 @ 07:00  --------------------------------------------------------  IN:    IV PiggyBack: 200 mL    lactated ringers.: 1100 mL    Oral Fluid: 50 mL  Total IN: 1350 mL    OUT:    Indwelling Catheter - Urethral: 645 mL  Total OUT: 645 mL    Total NET: 705 mL      11-12 @ 07:01  -  11-13 @ 00:02  --------------------------------------------------------  IN:    IV PiggyBack: 100 mL    lactated ringers.: 1200 mL  Total IN: 1300 mL    OUT:    Indwelling Catheter - Urethral: 175 mL    Voided: 800 mL  Total OUT: 975 mL    Total NET: 325 mL          11-12    139  |  107  |  17  ----------------------------<  104<H>  4.1   |  22  |  0.89    Ca    8.7      12 Nov 2019 03:31  Phos  4.6     11-12  Mg     1.9     11-12    TPro  6.1  /  Alb  3.5  /  TBili  0.2  /  DBili  x   /  AST  26  /  ALT  19  /  AlkPhos  74  11-11    [ ] Hall catheter, indication: N/A  Meds: lactated ringers. 1000 milliLiter(s) IV Continuous <Continuous>        HEMATOLOGIC  Meds:   [x] VTE Prophylaxis                        9.2    13.18 )-----------( 159      ( 12 Nov 2019 13:09 )             28.2     PT/INR - ( 11 Nov 2019 17:13 )   PT: 12.0 sec;   INR: 1.05 ratio         PTT - ( 11 Nov 2019 17:13 )  PTT:26.8 sec  Transfusion     [ ] PRBC   [ ] Platelets   [ ] FFP   [ ] Cryoprecipitate      INFECTIOUS DISEASES  WBC Count: 13.18 K/uL (11-12 @ 13:09)  WBC Count: 12.61 K/uL (11-12 @ 06:27)  WBC Count: 12.45 K/uL (11-12 @ 03:31)      ENDOCRINE  CAPILLARY BLOOD GLUCOSE      ACCESS DEVICES:  [x ] Peripheral IV  [ ] Central Venous Line	[ ] R	[ ] L	[ ] IJ	[ ] Fem	[ ] SC	Placed:   [ ] Arterial Line		[ ] R	[ ] L	[ ] Fem	[ ] Rad	[ ] Ax	Placed:   [ ] PICC:					[ ] Mediport  [ ] Urinary Catheter, Date Placed:   [x] Necessity of urinary, arterial, and venous catheters discussed    OTHER MEDICATIONS:  chlorhexidine 2% Cloths 1 Application(s) Topical daily  naloxone Injectable 0.1 milliGRAM(s) IV Push every 3 minutes PRN  nicotine - 21 mG/24Hr(s) Patch 1 patch Transdermal daily      CODE STATUS: full code

## 2019-11-13 NOTE — PROGRESS NOTE ADULT - ASSESSMENT
ASSESSMENT:  COLIN CHARLES is a 34y Female otherwise healthy who presents after a mechanical fall yesterday found to have grade III-IV splenic laceration and moderate-large hemoperitoneum now s/p glue embolization of splenic pseudoaneurysm    PLAN:    NEURO: acute pain  - Pain control with standing dilaudid PCA  -given 1mg ativan for agitation    RESPIRATORY: atelectasis  - Incentive spirometry, OOB when pain controlled    CARDIOVASCULAR:   - Hemodynamically stable, monitoring  - Trend H/H, lactate  - Bedrest in setting of active bleeding    GI/NUTRITION: mod-large volume hemoperitoneum   - CLD  - Serial abdominal exams    GENITOURINARY/RENAL: no acute issues  - Monitor I/Os, electrolytes    HEMATOLOGIC: acute bleeding from splenic laceration s/p glue embolization of splenic artery pseudoaneurysm  - Trend H/H q12h  - Hold VTE ppx in setting of active bleeding    INFECTIOUS DISEASE: No active issues  - Culture if febrile  -will need vaccinations prior to d/c    ENDOCRINE: No active issues  - Monitor glucose on BMP    DISPO: SICU

## 2019-11-13 NOTE — DIETITIAN INITIAL EVALUATION ADULT. - ADD RECOMMEND
1) Recommend to continue diet with no therapeutic restrictions at this time. Defer consistency to medical team, SLP. 2) RD to add health shake TID at this time; monitor intake/acceptance. If improvement in appetite noted, consider discontinuing. 3) Monitor and encourage PO intake. Encourage use of daily menus to promote optimal PO intake. Honor dietary preferences as expressed. 4) Monitor wt trends, nutrition related labs, skin integrity, hydration status, BM regularity 5) Consider psych eval.

## 2019-11-13 NOTE — PROGRESS NOTE ADULT - SUBJECTIVE AND OBJECTIVE BOX
Day 1 of Anesthesia Pain Management Service    SUBJECTIVE: Patient sleeping    Pain Scale Score:	[X] Refer to charted pain scores    THERAPY:    [ ] IV PCA Morphine		[ ] 5 mg/mL	[ ] 1 mg/mL  [X] IV PCA Hydromorphone	[ ] 5 mg/mL	[X] 1 mg/mL  [ ] IV PCA Fentanyl		[ ] 50 micrograms/mL    Demand dose: 0.2 mg     Lockout: 6 minutes   Continuous Rate: 0 mg/hr  4 Hour Limit: 4 mg    MEDICATIONS  (STANDING):  chlorhexidine 2% Cloths 1 Application(s) Topical daily  HYDROmorphone PCA (1 mG/mL) 30 milliLiter(s) PCA Continuous PCA Continuous  lactated ringers. 1000 milliLiter(s) (50 mL/Hr) IV Continuous <Continuous>  nicotine - 21 mG/24Hr(s) Patch 1 patch Transdermal daily  senna 2 Tablet(s) Oral at bedtime    MEDICATIONS  (PRN):  HYDROmorphone PCA (1 mG/mL) Rescue Clinician Bolus 0.5 milliGRAM(s) IV Push every 15 minutes PRN for Pain Scale GREATER THAN 6  naloxone Injectable 0.1 milliGRAM(s) IV Push every 3 minutes PRN For ANY of the following changes in patient status:  A. RR LESS THAN 10 breaths per minute, B. Oxygen saturation LESS THAN 90%, C. Sedation score of 6  ondansetron Injectable 4 milliGRAM(s) IV Push every 6 hours PRN Nausea and/or Vomiting      OBJECTIVE:    Sedation Score:	[ ] Alert 	[ ] Drowsy 	[ ] Arousable	[ x] Asleep	[ ] Unresponsive    Side Effects:	[X ] None	[ ] Nausea	[ ] Vomiting	[ ] Pruritus  		[ ] Other:    Vital Signs Last 24 Hrs  T(C): 36.9 (13 Nov 2019 07:00), Max: 38 (12 Nov 2019 23:00)  T(F): 98.4 (13 Nov 2019 07:00), Max: 100.4 (12 Nov 2019 23:00)  HR: 98 (13 Nov 2019 08:00) (66 - 117)  BP: 121/56 (13 Nov 2019 08:00) (106/52 - 131/60)  BP(mean): 81 (13 Nov 2019 08:00) (74 - 103)  RR: 26 (13 Nov 2019 08:00) (18 - 34)  SpO2: 93% (13 Nov 2019 08:00) (90% - 99%)    ASSESSMENT/ PLAN    Therapy to  be:               [X] Continued   [ ] Discontinued   [ ] Changed to PRN Analgesics    Documentation and Verification of current medications:   [X] Done	[ ] Not done, not eligible    Comments: Patient sleeping, received Ativan 1mg last pm. PCA use 0-4x/hr,  7.6mg in 24hours. Continue

## 2019-11-13 NOTE — DIETITIAN INITIAL EVALUATION ADULT. - ENERGY NEEDS
Ht: 63" (per pt)   Wt: 209.7 lbs   BMI: 37.2  kg/m2   IBW: 115 lbs  (+/-10%)     182 % IBW  Edema: none noted           Skin: surgical incision to right groin

## 2019-11-13 NOTE — DIETITIAN INITIAL EVALUATION ADULT. - REASON INDICATOR FOR ASSESSMENT
Nutrition Assessment warranted for length of stay.  Information obtained from: RN, comprehensive chart review, interdisciplinary medical rounds, sources

## 2019-11-13 NOTE — DIETITIAN INITIAL EVALUATION ADULT. - OTHER INFO
Pt is a 35 yo F without significant PMH. S/P grade 3-4 splenic laceration after mechanical fall, now PPD#1 s/p splenic embolization using glue with IR.    Per discussion with pt, reports migraine with nausea at time of RD visit (RN made aware). Observed lunch meal virtually untouched at tray table. RD offered alternative entree, however pt refused at this time. Pt educated on daily menus, and encouraged to utilize to promote optimal PO intake potential/meal enjoyment. Noted with emesis x 1 on 11/12. No BM's documented in-house at this time. To note, diet upgraded to regular this AM (11/13); was previously receiving clear liquid diet.     At baseline, pt reports good PO intake and denies prior history of suboptimal PO intake. Consumes 3+ meals daily. Food preferences obtained for no fish. Otherwise, no specific dietary preferences noted at this time. Pt denies food allergies, denies intolerance to chewing/swallowing. Denies taking daily vitamins/supplements PTA.     Pt reports UBW ~211 lbs, and denies history of wt known wt changes. Dosing wt (11/11): 209.7 lbs. Appears consistent with reported UBW. Will monitor.

## 2019-11-13 NOTE — PROGRESS NOTE ADULT - SUBJECTIVE AND OBJECTIVE BOX
Subjective:  Patient seen and evaluated this AM with team.  Interval events: hall dc'd; dilaudid PCA started for pain control; agitated so given 1mg ativan.  Still having abdominal pain, localized to left side.    OBJECTIVE:     ** PHYSICAL EXAM **    Gen: NAD, lying comfortably in bed  RESP: nonlabored breathing  ABDOMEN: soft, mildly-distended,  TTP LUQ  R groin: access site w/ dressing c/d/i    ** VITAL SIGNS / I&O's **    Vital Signs Last 24 Hrs  T(C): 36.7 (13 Nov 2019 11:00), Max: 38 (12 Nov 2019 23:00)  T(F): 98.1 (13 Nov 2019 11:00), Max: 100.4 (12 Nov 2019 23:00)  HR: 90 (13 Nov 2019 11:00) (67 - 117)  BP: 123/62 (13 Nov 2019 11:00) (106/52 - 131/60)  BP(mean): 84 (13 Nov 2019 11:00) (74 - 103)  RR: 22 (13 Nov 2019 11:00) (18 - 34)  SpO2: 93% (13 Nov 2019 11:00) (90% - 99%)      12 Nov 2019 07:01  -  13 Nov 2019 07:00  --------------------------------------------------------  IN:    IV PiggyBack: 300 mL    lactated ringers.: 1600 mL  Total IN: 1900 mL    OUT:    Indwelling Catheter - Urethral: 175 mL    Voided: 1150 mL  Total OUT: 1325 mL    Total NET: 575 mL      13 Nov 2019 07:01  -  13 Nov 2019 12:35  --------------------------------------------------------  IN:    lactated ringers.: 100 mL    lactated ringers.: 40 mL  Total IN: 140 mL    OUT:    Voided: 200 mL  Total OUT: 200 mL    Total NET: -60 mL            ** LABS **                          9.3    11.37 )-----------( 180      ( 13 Nov 2019 10:54 )             28.8     13 Nov 2019 00:30    140    |  105    |  9      ----------------------------<  100    4.1     |  23     |  0.67     Ca    8.4        13 Nov 2019 00:30  Phos  3.3       13 Nov 2019 00:30  Mg     1.8       13 Nov 2019 00:30      PT/INR - ( 11 Nov 2019 17:13 )   PT: 12.0 sec;   INR: 1.05 ratio         PTT - ( 11 Nov 2019 17:13 )  PTT:26.8 sec      MEDICATIONS  (STANDING):  chlorhexidine 2% Cloths 1 Application(s) Topical daily  HYDROmorphone PCA (1 mG/mL) 30 milliLiter(s) PCA Continuous PCA Continuous  lactated ringers. 1000 milliLiter(s) (20 mL/Hr) IV Continuous <Continuous>  nicotine - 21 mG/24Hr(s) Patch 1 patch Transdermal daily  senna 2 Tablet(s) Oral at bedtime    MEDICATIONS  (PRN):  acetaminophen   Tablet .. 975 milliGRAM(s) Oral every 6 hours PRN Mild Pain (1 - 3)  HYDROmorphone PCA (1 mG/mL) Rescue Clinician Bolus 0.5 milliGRAM(s) IV Push every 15 minutes PRN for Pain Scale GREATER THAN 6  naloxone Injectable 0.1 milliGRAM(s) IV Push every 3 minutes PRN For ANY of the following changes in patient status:  A. RR LESS THAN 10 breaths per minute, B. Oxygen saturation LESS THAN 90%, C. Sedation score of 6  ondansetron Injectable 4 milliGRAM(s) IV Push every 6 hours PRN Nausea and/or Vomiting

## 2019-11-13 NOTE — CHART NOTE - NSCHARTNOTEFT_GEN_A_CORE
SICU Transfer Note    Transfer from: SICU  Transfer to: [address] [place] [address]  Accepting physican: Promise      SICU COURSE:   34y Female with no significant pmhx or pshx who presents to the ED after sustaining a fall down multiple steps yesterday. Patient sustained a mechanical fall while taking out the garbage yesterday afternoon, denies LOC or head impact and is not on any anticoagulation. Patient was in her usual state of health prior to the fall and denies any preceding dizziness, or blurry vision. Patient recalls the sequence of events and was able to ambulate after the fall by herself. She initially felt well but was experiencing worsening chest and abdominal pain, prompting her to call EMS to bring her to the ED for further evaluation.     Upon presentation her H/H was 9.4/28.3. A CT scan was obtained demonstrating grade 3 to 4 splenic injury characterized by multiple deep parenchymal lacerations measuring greater than 3 cm in depth, as well as an arterially enhancing site at the inferior splenic pole which may represent a pseudoaneurysm vs active site of bleeding along with moderate to large volume hemoperitoneum. A repeat H/H was 7.9/23.9. She was ordered 2 units of pRBCs and IR was consulted for embolization. SICU was consulted for hemodynamic monitoring.    11/12: S/p glue embolization of splenic pseudoaneurysm by IR. Received 2 units PRBC and responded appropriately.     11/13: Kelly was discontinued. On dilaudid PCA for pain control. Given 1 mg of ativan for agitation. Stable H and H. Transferred from SICU to [address] [place] [address].     ASSESSMENT & PLAN:   34y Female w/o significant PMHx a/f grade 3-4 splenic laceration after mechanical fall, now PPD#1 s/p Splenic embolization using glue with IR.    - Pain control  - Regular diet  - Rec: UE XRs   - Rec: PT eval      Vital Signs Last 24 Hrs  T(C): 37.1 (14 Nov 2019 00:20), Max: 37.8 (13 Nov 2019 03:00)  T(F): 98.8 (14 Nov 2019 00:20), Max: 100 (13 Nov 2019 03:00)  HR: 97 (14 Nov 2019 00:20) (81 - 111)  BP: 111/70 (14 Nov 2019 00:20) (106/52 - 125/73)  BP(mean): 84 (13 Nov 2019 19:00) (74 - 91)  RR: 18 (14 Nov 2019 00:20) (18 - 32)  SpO2: 93% (14 Nov 2019 00:20) (90% - 98%)  I&O's Summary    12 Nov 2019 07:01  -  13 Nov 2019 07:00  --------------------------------------------------------  IN: 1900 mL / OUT: 1325 mL / NET: 575 mL    13 Nov 2019 07:01  -  14 Nov 2019 00:55  --------------------------------------------------------  IN: 300 mL / OUT: 200 mL / NET: 100 mL      Gen: NAD, lying comfortably in bed  RESP: nonlabored breathing  ABDOMEN: soft, mildly-distended,  TTP LUQ  R groin: access site w/ dressing c/d/i    MEDICATIONS  (STANDING):  chlorhexidine 2% Cloths 1 Application(s) Topical daily  HYDROmorphone PCA (1 mG/mL) 30 milliLiter(s) PCA Continuous PCA Continuous  lactated ringers. 1000 milliLiter(s) (20 mL/Hr) IV Continuous <Continuous>  nicotine - 21 mG/24Hr(s) Patch 1 patch Transdermal daily  senna 2 Tablet(s) Oral at bedtime    MEDICATIONS  (PRN):  acetaminophen   Tablet .. 975 milliGRAM(s) Oral every 6 hours PRN Mild Pain (1 - 3)  HYDROmorphone PCA (1 mG/mL) Rescue Clinician Bolus 0.5 milliGRAM(s) IV Push every 15 minutes PRN for Pain Scale GREATER THAN 6  naloxone Injectable 0.1 milliGRAM(s) IV Push every 3 minutes PRN For ANY of the following changes in patient status:  A. RR LESS THAN 10 breaths per minute, B. Oxygen saturation LESS THAN 90%, C. Sedation score of 6  ondansetron Injectable 4 milliGRAM(s) IV Push every 6 hours PRN Nausea and/or Vomiting        LABS                                            9.3                   Neurophils% (auto):   x      (11-13 @ 10:54):    11.37)-----------(180          Lymphocytes% (auto):  x                                             28.8                   Eosinphils% (auto):   x        Manual%: Neutrophils x    ; Lymphocytes x    ; Eosinophils x    ; Bands%: x    ; Blasts x

## 2019-11-13 NOTE — PROGRESS NOTE ADULT - ASSESSMENT
34y Female w/o significant PMHx a/f grade 3-4 splenic laceration after mechanical fall, now PPD#1 s/p Splenic embolization using glue with IR.    Plan:   - Appreciate care per SICU  - Rec: UE XRs   - Rec: PT eval

## 2019-11-14 LAB
ANION GAP SERPL CALC-SCNC: 13 MMOL/L — SIGNIFICANT CHANGE UP (ref 5–17)
ANION GAP SERPL CALC-SCNC: 13 MMOL/L — SIGNIFICANT CHANGE UP (ref 5–17)
APPEARANCE UR: CLEAR — SIGNIFICANT CHANGE UP
BILIRUB UR-MCNC: NEGATIVE — SIGNIFICANT CHANGE UP
BUN SERPL-MCNC: 6 MG/DL — LOW (ref 7–23)
BUN SERPL-MCNC: 7 MG/DL — SIGNIFICANT CHANGE UP (ref 7–23)
CALCIUM SERPL-MCNC: 8.5 MG/DL — SIGNIFICANT CHANGE UP (ref 8.4–10.5)
CALCIUM SERPL-MCNC: 8.8 MG/DL — SIGNIFICANT CHANGE UP (ref 8.4–10.5)
CHLORIDE SERPL-SCNC: 101 MMOL/L — SIGNIFICANT CHANGE UP (ref 96–108)
CHLORIDE SERPL-SCNC: 102 MMOL/L — SIGNIFICANT CHANGE UP (ref 96–108)
CO2 SERPL-SCNC: 22 MMOL/L — SIGNIFICANT CHANGE UP (ref 22–31)
CO2 SERPL-SCNC: 24 MMOL/L — SIGNIFICANT CHANGE UP (ref 22–31)
COLOR SPEC: SIGNIFICANT CHANGE UP
CREAT SERPL-MCNC: 0.65 MG/DL — SIGNIFICANT CHANGE UP (ref 0.5–1.3)
CREAT SERPL-MCNC: 0.68 MG/DL — SIGNIFICANT CHANGE UP (ref 0.5–1.3)
DIFF PNL FLD: NEGATIVE — SIGNIFICANT CHANGE UP
GLUCOSE SERPL-MCNC: 83 MG/DL — SIGNIFICANT CHANGE UP (ref 70–99)
GLUCOSE SERPL-MCNC: 97 MG/DL — SIGNIFICANT CHANGE UP (ref 70–99)
GLUCOSE UR QL: NEGATIVE — SIGNIFICANT CHANGE UP
HCT VFR BLD CALC: 27.5 % — LOW (ref 34.5–45)
HCT VFR BLD CALC: 27.8 % — LOW (ref 34.5–45)
HGB BLD-MCNC: 8.9 G/DL — LOW (ref 11.5–15.5)
HGB BLD-MCNC: 9.1 G/DL — LOW (ref 11.5–15.5)
KETONES UR-MCNC: ABNORMAL
LEUKOCYTE ESTERASE UR-ACNC: NEGATIVE — SIGNIFICANT CHANGE UP
MAGNESIUM SERPL-MCNC: 1.8 MG/DL — SIGNIFICANT CHANGE UP (ref 1.6–2.6)
MAGNESIUM SERPL-MCNC: 1.8 MG/DL — SIGNIFICANT CHANGE UP (ref 1.6–2.6)
MCHC RBC-ENTMCNC: 29.7 PG — SIGNIFICANT CHANGE UP (ref 27–34)
MCHC RBC-ENTMCNC: 29.9 PG — SIGNIFICANT CHANGE UP (ref 27–34)
MCHC RBC-ENTMCNC: 32 GM/DL — SIGNIFICANT CHANGE UP (ref 32–36)
MCHC RBC-ENTMCNC: 33.1 GM/DL — SIGNIFICANT CHANGE UP (ref 32–36)
MCV RBC AUTO: 89.9 FL — SIGNIFICANT CHANGE UP (ref 80–100)
MCV RBC AUTO: 93.3 FL — SIGNIFICANT CHANGE UP (ref 80–100)
NITRITE UR-MCNC: NEGATIVE — SIGNIFICANT CHANGE UP
NRBC # BLD: 0 /100 WBCS — SIGNIFICANT CHANGE UP (ref 0–0)
PH UR: 8 — SIGNIFICANT CHANGE UP (ref 5–8)
PHOSPHATE SERPL-MCNC: 2.5 MG/DL — SIGNIFICANT CHANGE UP (ref 2.5–4.5)
PHOSPHATE SERPL-MCNC: 3.3 MG/DL — SIGNIFICANT CHANGE UP (ref 2.5–4.5)
PLATELET # BLD AUTO: 219 K/UL — SIGNIFICANT CHANGE UP (ref 150–400)
PLATELET # BLD AUTO: 226 K/UL — SIGNIFICANT CHANGE UP (ref 150–400)
POTASSIUM SERPL-MCNC: 3.8 MMOL/L — SIGNIFICANT CHANGE UP (ref 3.5–5.3)
POTASSIUM SERPL-MCNC: 3.9 MMOL/L — SIGNIFICANT CHANGE UP (ref 3.5–5.3)
POTASSIUM SERPL-SCNC: 3.8 MMOL/L — SIGNIFICANT CHANGE UP (ref 3.5–5.3)
POTASSIUM SERPL-SCNC: 3.9 MMOL/L — SIGNIFICANT CHANGE UP (ref 3.5–5.3)
PROT UR-MCNC: SIGNIFICANT CHANGE UP
RBC # BLD: 2.98 M/UL — LOW (ref 3.8–5.2)
RBC # BLD: 3.06 M/UL — LOW (ref 3.8–5.2)
RBC # FLD: 13.2 % — SIGNIFICANT CHANGE UP (ref 10.3–14.5)
RBC # FLD: 13.2 % — SIGNIFICANT CHANGE UP (ref 10.3–14.5)
SODIUM SERPL-SCNC: 136 MMOL/L — SIGNIFICANT CHANGE UP (ref 135–145)
SODIUM SERPL-SCNC: 139 MMOL/L — SIGNIFICANT CHANGE UP (ref 135–145)
SP GR SPEC: 1.02 — SIGNIFICANT CHANGE UP (ref 1.01–1.02)
UROBILINOGEN FLD QL: NEGATIVE — SIGNIFICANT CHANGE UP
WBC # BLD: 11.69 K/UL — HIGH (ref 3.8–10.5)
WBC # BLD: 13.29 K/UL — HIGH (ref 3.8–10.5)
WBC # FLD AUTO: 11.69 K/UL — HIGH (ref 3.8–10.5)
WBC # FLD AUTO: 13.29 K/UL — HIGH (ref 3.8–10.5)

## 2019-11-14 PROCEDURE — 71275 CT ANGIOGRAPHY CHEST: CPT | Mod: 26

## 2019-11-14 PROCEDURE — 99233 SBSQ HOSP IP/OBS HIGH 50: CPT

## 2019-11-14 PROCEDURE — 93970 EXTREMITY STUDY: CPT | Mod: 26

## 2019-11-14 PROCEDURE — 74177 CT ABD & PELVIS W/CONTRAST: CPT | Mod: 26

## 2019-11-14 PROCEDURE — 71045 X-RAY EXAM CHEST 1 VIEW: CPT | Mod: 26

## 2019-11-14 RX ORDER — ACETAMINOPHEN 500 MG
975 TABLET ORAL EVERY 6 HOURS
Refills: 0 | Status: DISCONTINUED | OUTPATIENT
Start: 2019-11-14 | End: 2019-11-17

## 2019-11-14 RX ORDER — ENOXAPARIN SODIUM 100 MG/ML
40 INJECTION SUBCUTANEOUS DAILY
Refills: 0 | Status: DISCONTINUED | OUTPATIENT
Start: 2019-11-14 | End: 2019-11-17

## 2019-11-14 RX ORDER — OXYCODONE HYDROCHLORIDE 5 MG/1
5 TABLET ORAL EVERY 4 HOURS
Refills: 0 | Status: DISCONTINUED | OUTPATIENT
Start: 2019-11-14 | End: 2019-11-17

## 2019-11-14 RX ORDER — OXYCODONE HYDROCHLORIDE 5 MG/1
10 TABLET ORAL EVERY 4 HOURS
Refills: 0 | Status: DISCONTINUED | OUTPATIENT
Start: 2019-11-14 | End: 2019-11-17

## 2019-11-14 RX ADMIN — OXYCODONE HYDROCHLORIDE 10 MILLIGRAM(S): 5 TABLET ORAL at 19:03

## 2019-11-14 RX ADMIN — Medication 1 PATCH: at 12:49

## 2019-11-14 RX ADMIN — Medication 1 PATCH: at 07:45

## 2019-11-14 RX ADMIN — OXYCODONE HYDROCHLORIDE 5 MILLIGRAM(S): 5 TABLET ORAL at 11:30

## 2019-11-14 RX ADMIN — Medication 975 MILLIGRAM(S): at 18:17

## 2019-11-14 RX ADMIN — OXYCODONE HYDROCHLORIDE 10 MILLIGRAM(S): 5 TABLET ORAL at 14:55

## 2019-11-14 RX ADMIN — Medication 975 MILLIGRAM(S): at 13:20

## 2019-11-14 RX ADMIN — Medication 1 PATCH: at 12:56

## 2019-11-14 RX ADMIN — HYDROMORPHONE HYDROCHLORIDE 0.5 MILLIGRAM(S): 2 INJECTION INTRAMUSCULAR; INTRAVENOUS; SUBCUTANEOUS at 00:16

## 2019-11-14 RX ADMIN — HYDROMORPHONE HYDROCHLORIDE 30 MILLILITER(S): 2 INJECTION INTRAMUSCULAR; INTRAVENOUS; SUBCUTANEOUS at 07:31

## 2019-11-14 RX ADMIN — Medication 975 MILLIGRAM(S): at 13:50

## 2019-11-14 RX ADMIN — ONDANSETRON 4 MILLIGRAM(S): 8 TABLET, FILM COATED ORAL at 00:16

## 2019-11-14 RX ADMIN — OXYCODONE HYDROCHLORIDE 5 MILLIGRAM(S): 5 TABLET ORAL at 11:00

## 2019-11-14 RX ADMIN — Medication 975 MILLIGRAM(S): at 18:47

## 2019-11-14 RX ADMIN — ENOXAPARIN SODIUM 40 MILLIGRAM(S): 100 INJECTION SUBCUTANEOUS at 16:41

## 2019-11-14 RX ADMIN — OXYCODONE HYDROCHLORIDE 10 MILLIGRAM(S): 5 TABLET ORAL at 23:38

## 2019-11-14 RX ADMIN — Medication 975 MILLIGRAM(S): at 03:00

## 2019-11-14 RX ADMIN — SENNA PLUS 2 TABLET(S): 8.6 TABLET ORAL at 21:59

## 2019-11-14 RX ADMIN — HYDROMORPHONE HYDROCHLORIDE 0.5 MILLIGRAM(S): 2 INJECTION INTRAMUSCULAR; INTRAVENOUS; SUBCUTANEOUS at 05:21

## 2019-11-14 RX ADMIN — Medication 1 PATCH: at 19:42

## 2019-11-14 RX ADMIN — OXYCODONE HYDROCHLORIDE 10 MILLIGRAM(S): 5 TABLET ORAL at 19:33

## 2019-11-14 RX ADMIN — OXYCODONE HYDROCHLORIDE 10 MILLIGRAM(S): 5 TABLET ORAL at 15:35

## 2019-11-14 RX ADMIN — Medication 975 MILLIGRAM(S): at 03:30

## 2019-11-14 NOTE — PHYSICAL THERAPY INITIAL EVALUATION ADULT - BALANCE TRAINING, PT EVAL
GOAL: Pt will improve static/dynamic standing balance by 1/2 grade to improve level of independence with mobility skills and ADLs in 2 weeks. GOAL: Pt will improve static/dynamic standing balance by 1/2 grade to improve level of independence with mobility skills and ADLs in 1 week.

## 2019-11-14 NOTE — PHYSICAL THERAPY INITIAL EVALUATION ADULT - ADDITIONAL COMMENTS
Pt lives with sister-in-law in private home with 1 flight to enter with +HR if enter through garage. 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 Pt reports she recently moved to [place] from [place].

## 2019-11-14 NOTE — PHYSICAL THERAPY INITIAL EVALUATION ADULT - PLANNED THERAPY INTERVENTIONS, PT EVAL
balance training/gait training/GOAL: Pt will negotiate 13 stairs with 1 HR and step over step pattern with independence in 2 weeks. GOAL: Pt will negotiate 13 stairs with 1 HR and step over step pattern with independence in 1 week./gait training/balance training

## 2019-11-14 NOTE — PHYSICAL THERAPY INITIAL EVALUATION ADULT - DISCHARGE DISPOSITION, PT EVAL
Home with no skilled PT needs, no AD recommendation. Supervision with ambulation at this time./no skilled PT needs/home

## 2019-11-14 NOTE — OCCUPATIONAL THERAPY INITIAL EVALUATION ADULT - LIVES WITH, PROFILE
other relative/Pt resides with sister-in-law in house with 1 flight to enter, +HR, +2 floors, bathroom on each floor, +tub.

## 2019-11-14 NOTE — PHYSICAL THERAPY INITIAL EVALUATION ADULT - GAIT TRAINING, PT EVAL
GOAL: Pt will ambulate 150 feet with no AD and (I) in 2 weeks. GOAL: Pt will ambulate 150 feet with no AD and (I) in 1 week.

## 2019-11-14 NOTE — OCCUPATIONAL THERAPY INITIAL EVALUATION ADULT - PERTINENT HX OF CURRENT PROBLEM, REHAB EVAL
33 yo F with no significant pm/pshx who is s/p mechanical fall yesterday 11/10 and presented to the ED due to worsening chest and abdominal pain worsened with inspiration. On admission, the patient was HD stable, with interval decrease in blood pressure which responded to 1 L saline boluses. Imaging revealed a large hemoperitoneum, with a foci of hyperdensity suspicious for acute hemorrhage. IR and SICU consulted, pt s/p glue embolization of splenic pseudoaneurysm by IR, 11/12.

## 2019-11-14 NOTE — PROGRESS NOTE ADULT - SUBJECTIVE AND OBJECTIVE BOX
SUBJECTIVE: Pt seen and examined at bedside with chief resident.  This morning, the patient had a fever to 101.3 and on [id number] with mild tachycardia to 110s, she's warm and complaining of chills   She complains of continued pain in her abdomen, unchanged from before.     MEDICATIONS  (STANDING):  enoxaparin Injectable 40 milliGRAM(s) SubCutaneous daily  nicotine - 21 mG/24Hr(s) Patch 1 patch Transdermal daily  senna 2 Tablet(s) Oral at bedtime    MEDICATIONS  (PRN):  acetaminophen   Tablet .. 975 milliGRAM(s) Oral every 6 hours PRN Temp greater or equal to 38C (100.4F), Mild Pain (1 - 3)  oxyCODONE    IR 5 milliGRAM(s) Oral every 4 hours PRN Moderate Pain (4 - 6)      OBJECTIVE:    Vital Signs Last 24 Hrs  T(C): 39.1 (14 Nov 2019 13:00), Max: 39.1 (14 Nov 2019 13:00)  T(F): 102.3 (14 Nov 2019 13:00), Max: 102.3 (14 Nov 2019 13:00)  HR: 108 (14 Nov 2019 13:00) (81 - 108)  BP: 101/59 (14 Nov 2019 13:00) (101/59 - 121/78)  BP(mean): 84 (13 Nov 2019 19:00) (82 - 84)  RR: 20 (14 Nov 2019 13:00) (18 - 22)  SpO2: 92% (14 Nov 2019 13:00) (92% - 98%)    Gen: NAD, lying comfortably in bed  RESP: nonlabored breathing  ABDOMEN: soft, mildly-distended, increased tenderness to palpation in LUQ  R groin: access site w/ dressing c/d/i    I&O's Summary    13 Nov 2019 07:01  -  14 Nov 2019 07:00  --------------------------------------------------------  IN: 500 mL / OUT: 800 mL / NET: -300 mL    14 Nov 2019 07:01  -  14 Nov 2019 14:07  --------------------------------------------------------  IN: 125 mL / OUT: 625 mL / NET: -500 mL      I&O's Detail    13 Nov 2019 07:01  -  14 Nov 2019 07:00  --------------------------------------------------------  IN:    lactated ringers.: 100 mL    lactated ringers.: 400 mL  Total IN: 500 mL    OUT:    Voided: 800 mL  Total OUT: 800 mL    Total NET: -300 mL      14 Nov 2019 07:01  -  14 Nov 2019 14:07  --------------------------------------------------------  IN:    Oral Fluid: 125 mL  Total IN: 125 mL    OUT:    Voided: 625 mL  Total OUT: 625 mL    Total NET: -500 mL            LABS:                        8.9    13.29 )-----------( 219      ( 14 Nov 2019 08:19 )             27.8     11-14    139  |  102  |  6<L>  ----------------------------<  83  3.9   |  24  |  0.68    Ca    8.5      14 Nov 2019 06:43  Phos  3.3     11-14  Mg     1.8     11-14            RADIOLOGY & ADDITIONAL STUDIES:

## 2019-11-14 NOTE — PHYSICAL THERAPY INITIAL EVALUATION ADULT - PRECAUTIONS/LIMITATIONS, REHAB EVAL
fall precautions fall precautions/(cont. from above): CT AP (11/11): grade 3-4 splenic injury with multiple deep parechymal lacerations, pseudoaneurysm, mod-large volume hemoperitoneum. X-ray R humerus (11/13): (-). Pt transferred from SICU to  on 11/13.

## 2019-11-14 NOTE — PHYSICAL THERAPY INITIAL EVALUATION ADULT - PERTINENT HX OF CURRENT PROBLEM, REHAB EVAL
34 year old F with no significant pm/pshx who is s/p mechanical fall yesterday and presented to the ED due to worsening chest and abdominal pain worsened with inspiration. On admission, the patient was HD stable, with interval decrease in blood pressure which responded to 1 L saline boluses. Imaging revealed a large hemoperitoneum, with a foci of hyperdensity suspicious for acute hemorrhage. 34 year old F with no significant pm/pshx who is s/p mechanical fall yesterday and presented to the ED due to worsening chest and abdominal pain worsened with inspiration. On admission, the patient was HD stable, with interval decrease in blood pressure which responded to 1 L saline boluses. Imaging revealed a large hemoperitoneum, with a foci of hyperdensity suspicious for acute hemorrhage. Pt s/p splenic embolization 11/11. (cont. below):

## 2019-11-14 NOTE — PROGRESS NOTE ADULT - ASSESSMENT
34y Female w/o significant PMHx a/f grade 3-4 splenic laceration after mechanical fall, now PPD#2 s/p Splenic embolization using glue with IR. Now with tachycardia, fevers and increased pain.    Plan:  - LE duplex negative for DVTs-- start ppx anticoagulation  - CTA of chest for tachycardia  - CT abdomen and pelvis for fevers and tachycardia  - D/c PCA, start oral pain medications    ACS & Trauma, [id number]

## 2019-11-14 NOTE — OCCUPATIONAL THERAPY INITIAL EVALUATION ADULT - ADDITIONAL COMMENTS
R shoulder Xray 11/11: No fractures or dislocations. Preserved joint spaces. R humerus Xray 11/13: Normal right humerus.Xray chest 11/11: Poor inspiratory effort. The heart is normal in size. The lungs are clear. No pneumothorax. No acute fractures could be identified. However if clinically warranted dedicated views should be obtained.CT Chest 11/11: Grade 3 to 4 splenic injury characterized by multiple deep parenchymal lacerations measuring greater than 3 cm in depth, as well as an arterially enhancing site at the inferior splenic pole which may represent a pseudoaneurysm vs active site of bleeding.Moderate to large volume hemoperitoneum.

## 2019-11-14 NOTE — PROGRESS NOTE ADULT - SUBJECTIVE AND OBJECTIVE BOX
Day 3\2 of Anesthesia Pain Management Service    SUBJECTIVE: Doing well    Pain Scale Score:	[X] Refer to charted pain scores    THERAPY:    [ ] IV PCA Morphine		        [ ] 5 mg/mL	[ ] 1 mg/mL  [X] IV PCA Hydromorphone	[ ] 5 mg/mL	[X] 1 mg/mL  [ ] IV PCA Fentanyl		        [ ] 50 micrograms/mL    Demand dose: 0.2 mg     Lockout: 6 minutes   Continuous Rate: 0 mg/hr  4 Hour Limit: 4 mg    MEDICATIONS  (STANDING):  nicotine - 21 mG/24Hr(s) Patch 1 patch Transdermal daily  senna 2 Tablet(s) Oral at bedtime    MEDICATIONS  (PRN):  acetaminophen   Tablet .. 975 milliGRAM(s) Oral every 6 hours PRN Mild Pain (1 - 3)  oxyCODONE    IR 5 milliGRAM(s) Oral every 4 hours PRN Moderate Pain (4 - 6)      OBJECTIVE:    Sedation Score:	[ X] Alert	 [ ] Drowsy 	[ ] Arousable	[ ] Asleep	[ ] Unresponsive    Side Effects:	[X ] None	[ ] Nausea	[ ] Vomiting	[ ] Pruritus  		[ ] Other:    Vital Signs Last 24 Hrs  T(C): 37.7 (14 Nov 2019 09:49), Max: 37.7 (14 Nov 2019 09:49)  T(F): 99.8 (14 Nov 2019 09:49), Max: 99.8 (14 Nov 2019 09:49)  HR: 95 (14 Nov 2019 09:49) (81 - 99)  BP: 121/78 (14 Nov 2019 09:49) (110/62 - 123/62)  BP(mean): 84 (13 Nov 2019 19:00) (82 - 84)  RR: 18 (14 Nov 2019 09:49) (18 - 22)  SpO2: 93% (14 Nov 2019 09:49) (93% - 98%)    ASSESSMENT/ PLAN    Therapy to  be:               [  ] Continued   [X ] Discontinued   [ X] Changed to PRN Analgesics    Documentation and Verification of current medications:   [X] Done	[ ] Not done, not eligible    Comments: PCA D\C'd by primary service.

## 2019-11-15 LAB
CULTURE RESULTS: SIGNIFICANT CHANGE UP
HCT VFR BLD CALC: 28.4 % — LOW (ref 34.5–45)
HGB BLD-MCNC: 9.3 G/DL — LOW (ref 11.5–15.5)
MCHC RBC-ENTMCNC: 29.4 PG — SIGNIFICANT CHANGE UP (ref 27–34)
MCHC RBC-ENTMCNC: 32.7 GM/DL — SIGNIFICANT CHANGE UP (ref 32–36)
MCV RBC AUTO: 89.9 FL — SIGNIFICANT CHANGE UP (ref 80–100)
NRBC # BLD: 0 /100 WBCS — SIGNIFICANT CHANGE UP (ref 0–0)
PLATELET # BLD AUTO: 277 K/UL — SIGNIFICANT CHANGE UP (ref 150–400)
RBC # BLD: 3.16 M/UL — LOW (ref 3.8–5.2)
RBC # FLD: 13.1 % — SIGNIFICANT CHANGE UP (ref 10.3–14.5)
SPECIMEN SOURCE: SIGNIFICANT CHANGE UP
WBC # BLD: 9.6 K/UL — SIGNIFICANT CHANGE UP (ref 3.8–10.5)
WBC # FLD AUTO: 9.6 K/UL — SIGNIFICANT CHANGE UP (ref 3.8–10.5)

## 2019-11-15 PROCEDURE — 99231 SBSQ HOSP IP/OBS SF/LOW 25: CPT

## 2019-11-15 PROCEDURE — 99232 SBSQ HOSP IP/OBS MODERATE 35: CPT

## 2019-11-15 RX ADMIN — Medication 975 MILLIGRAM(S): at 09:01

## 2019-11-15 RX ADMIN — OXYCODONE HYDROCHLORIDE 10 MILLIGRAM(S): 5 TABLET ORAL at 14:52

## 2019-11-15 RX ADMIN — Medication 1 PATCH: at 14:56

## 2019-11-15 RX ADMIN — OXYCODONE HYDROCHLORIDE 10 MILLIGRAM(S): 5 TABLET ORAL at 10:37

## 2019-11-15 RX ADMIN — ENOXAPARIN SODIUM 40 MILLIGRAM(S): 100 INJECTION SUBCUTANEOUS at 12:07

## 2019-11-15 RX ADMIN — OXYCODONE HYDROCHLORIDE 10 MILLIGRAM(S): 5 TABLET ORAL at 23:31

## 2019-11-15 RX ADMIN — OXYCODONE HYDROCHLORIDE 10 MILLIGRAM(S): 5 TABLET ORAL at 15:20

## 2019-11-15 RX ADMIN — OXYCODONE HYDROCHLORIDE 10 MILLIGRAM(S): 5 TABLET ORAL at 06:03

## 2019-11-15 RX ADMIN — OXYCODONE HYDROCHLORIDE 10 MILLIGRAM(S): 5 TABLET ORAL at 10:07

## 2019-11-15 RX ADMIN — OXYCODONE HYDROCHLORIDE 10 MILLIGRAM(S): 5 TABLET ORAL at 00:00

## 2019-11-15 RX ADMIN — Medication 1 PATCH: at 19:30

## 2019-11-15 RX ADMIN — Medication 975 MILLIGRAM(S): at 09:30

## 2019-11-15 RX ADMIN — Medication 1 PATCH: at 14:20

## 2019-11-15 RX ADMIN — Medication 975 MILLIGRAM(S): at 01:14

## 2019-11-15 RX ADMIN — OXYCODONE HYDROCHLORIDE 10 MILLIGRAM(S): 5 TABLET ORAL at 19:47

## 2019-11-15 RX ADMIN — OXYCODONE HYDROCHLORIDE 10 MILLIGRAM(S): 5 TABLET ORAL at 19:17

## 2019-11-15 RX ADMIN — Medication 975 MILLIGRAM(S): at 18:14

## 2019-11-15 RX ADMIN — Medication 975 MILLIGRAM(S): at 17:44

## 2019-11-15 RX ADMIN — Medication 1 PATCH: at 07:06

## 2019-11-15 NOTE — PROVIDER CONTACT NOTE (OTHER) - ASSESSMENT
pt resting comfortably in bed, no c/o pain, no SOB. pt feeling chills at this time. temp 101.5, , /72 and SPO2 96 on RA.
A&Ox4. holding left side.
Pt A/Ox4, using PCA pump, on room air.
Pt Hot to touch. A&Ox4. BP stable. Pt Anxious, C/o headache and left sided pain.
VS WDL. Pain medication given. Pt A&Ox4 and educated on reason for NPO status. Pt still demanding ice chips
pt /71(92), HR 80, SpO2 96% RR 22 on 2LNC. Pt severely restless complaining of 11/10 pain in abdomen and chest. No acute signs of bleeding, EKG performed and reads normal sinus. pt has expiratory wheezes in b/l lungs and complains of being unable to breath. Pt educated on need for blood work to evaluate possible bleeding
pt agitated, sweating, tachycardic and showing signs of withdrawal

## 2019-11-15 NOTE — PROVIDER CONTACT NOTE (OTHER) - BACKGROUND
admitted s/p fall with grade IV splenic laceration.
Pt s/p fall down the stair with splenic infarcts, s/p IR glue embolization
S/p Fall at home; 11/11 Spleen embolization.
pt S/P fall with grade IV splenic laceration
pt S/P fall with grade IV splenic laceration, post splenic embolization in IR
pt s/p fall at home with spleen lac
s/P Fall down stairs; resulting in splenic injury.

## 2019-11-15 NOTE — PROGRESS NOTE ADULT - SUBJECTIVE AND OBJECTIVE BOX
Interventional Radiology Follow- Up Note      This is a 34y Female s/p splenic angiogram and embolization on 11/11 in Interventional Radiology with Dr. Huang.     Patient seen and examined @ bedside. Patient reporting significant abdominal pain with minimal improvement on current PO pain regimen. Patient states she does not have much of an appetite being that when she eats it exacerbates her abdominal pain. Patient ambulating out of bed to the bathroom, voiding without difficulty. Denies nausea, vomiting, chest pain, dyspnea or change in mental status.    Pt has been ST since yesterday, spiked fever tmax 102.3.    Vitals: T(F): 100.4 (11-15-19 @ 09:25), Max: 102.3 (11-14-19 @ 13:00)  HR: 96 (11-15-19 @ 09:25) (90 - 109)  BP: 117/75 (11-15-19 @ 09:25) (101/59 - 128/78)  RR: 18 (11-15-19 @ 09:25) (17 - 20)  SpO2: 96% (11-15-19 @ 09:25) (92% - 97%)      LABS:                        9.1    11.69 )-----------( 226      ( 14 Nov 2019 15:27 )             27.5     11-14    136  |  101  |  7   ----------------------------<  97  3.8   |  22  |  0.65    Ca    8.8      14 Nov 2019 15:27  Phos  2.5     11-14  Mg     1.8     11-14      Antibiotics: n/a    PHYSICAL EXAM:  General: Nontoxic, in NAD, A&O x3  Abdomen: soft, TTP, generalized abdominal pain  Extremities:  right groin clean, dry and intact, soft with no evidence of hematoma, right femoral/dp/pt pulses +2. No pedal edema or calf tenderness noted        Impression: 34y Female admitted with grade 3-4 splenic laceration now s/p splenic angiogram day #4.  Patient is hemodynamically stable with acute onset of tachycardia, fevers and increased abdominal pain. CT scan reviewed with IR attending showing splenic infarction, moderate hemoperitoneum and small-moderate bibasilar atelectasis, all of which could be pertaining to the patients current clinical status.     Plan:  - continue to monitor for fevers- febrile yesterday with tmax 102.3. May be due to post embolization syndrome, but must r/o infection etiology- recommend u/a, cx  - vaccines for encapsulated organisms prior to discharge- pneumovax  -tylenol prn  -monitor vitals/cbc  -continue to monitor output  -further management per primary team  -encourage ambulation   -will discuss with IR attending     Please call IR at extension [phone] with any questions, concerns, or issues regarding above.        Caitlin Carlton [facility]-BC  Spectra # [id number]

## 2019-11-15 NOTE — PROVIDER CONTACT NOTE (OTHER) - ACTION/TREATMENT ORDERED:
CXR. Blood culture x2, CBC, Metabolic,  U/A, UCx, VBG ordered.
MD made aware. MD to bedside to assess pt. give PRN Tylenol as ordered, no other interventions at this time. will continue to monitor.
1mg ativan ordered
No new order at this time.
No new order at this time.
PA Viveros at bedside. EKG evaluated. 0.5mg IV Dilaudid ordered. One time Duoneb ordered. Pt agreed to blood work after pain medication and Duoneb given. Blood work drawn as soon as pt consented @ 2200
PA assisted in educating pt. pt still refused NPO status and was given ice chips

## 2019-11-15 NOTE — PROVIDER CONTACT NOTE (OTHER) - SITUATION
elevated temp and HR
Pt clamped own IV that PCA pump medication is going through. When pump rang occlusion pt stated "Oh I clamped my IV."
Pt febrile 102.3. Tachycardic  108. 2LNC applied, Pt anxious.
Pt with c/o lest sided chest pain with deep breathing after repositioning herself. VSS  /78, 91HR 96% RA
pt agitated, sweating, tachycardic and showing signs of withdrawal
pt arrived on unit from IR after splenic artery embolization
pt complaining of pain and demanding ice chips despite NPO status

## 2019-11-15 NOTE — PROGRESS NOTE ADULT - ASSESSMENT
34y Female w/o significant PMHx a/f grade 3-4 splenic laceration after mechanical fall, now PPD#3 s/p Splenic embolization using glue with IR. Now with tachycardia, fevers and increased pain. 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

## 2019-11-15 NOTE — PROVIDER CONTACT NOTE (OTHER) - RECOMMENDATIONS
PRN Tylenol, ice packs applied
Assess pt
CXR
Come assess at bedside, assist in further educating patient on need for blood work. Evaluate EKG. Pain medication STAT. Duoneb STAT
Come assist in educating pt
come assess patient

## 2019-11-15 NOTE — PROGRESS NOTE ADULT - SUBJECTIVE AND OBJECTIVE BOX
SUBJECTIVE: Pt seen and examined at bedside with chief resident.  Patient feels a lot better today than yesterday, minimal pain, but reports chills and fevers  Overnight, continued to spike fevers but remained hemodynamically stable   Complains of left shoulder pain    MEDICATIONS  (STANDING):  enoxaparin Injectable 40 milliGRAM(s) SubCutaneous daily  nicotine - 21 mG/24Hr(s) Patch 1 patch Transdermal daily  senna 2 Tablet(s) Oral at bedtime    MEDICATIONS  (PRN):  acetaminophen   Tablet .. 975 milliGRAM(s) Oral every 6 hours PRN Temp greater or equal to 38C (100.4F), Mild Pain (1 - 3)  oxyCODONE    IR 5 milliGRAM(s) Oral every 4 hours PRN Moderate Pain (4 - 6)  oxyCODONE    IR 10 milliGRAM(s) Oral every 4 hours PRN Severe Pain (7 - 10)      OBJECTIVE:    Vital Signs Last 24 Hrs  T(C): 38 (15 Nov 2019 09:25), Max: 39.1 (2019 13:00)  T(F): 100.4 (15 Nov 2019 09:25), Max: 102.3 (2019 13:00)  HR: 96 (15 Nov 2019 09:25) (90 - 109)  BP: 117/75 (15 Nov 2019 09:25) (101/59 - 128/78)  BP(mean): --  RR: 18 (15 Nov 2019 09:25) (17 - 20)  SpO2: 96% (15 Nov 2019 09:25) (92% - 97%)    General Appearance: Resting comfortably, no acute distress  Chest: non-labored breathing, no respiratory distress  CV: Pulse regular presently  Abdomen: Soft, diffuse tenderness, worse in right and left upper quadrants   Extremities: warm and well perfused    I&O's Summary    2019 07:01  -  15 Nov 2019 07:00  --------------------------------------------------------  IN: 225 mL / OUT: 1025 mL / NET: -800 mL    15 Nov 2019 07:01  -  15 Nov 2019 11:14  --------------------------------------------------------  IN: 280 mL / OUT: 0 mL / NET: 280 mL      I&O's Detail    2019 07:  -  15 Nov 2019 07:00  --------------------------------------------------------  IN:    Oral Fluid: 225 mL  Total IN: 225 mL    OUT:    Voided: 1025 mL  Total OUT: 1025 mL    Total NET: -800 mL      15 Nov 2019 07:  -  15 Nov 2019 11:14  --------------------------------------------------------  IN:    Oral Fluid: 280 mL  Total IN: 280 mL    OUT:  Total OUT: 0 mL    Total NET: 280 mL            LABS:                        9.1    11.69 )-----------( 226      ( 2019 15:27 )             27.5     11-14    136  |  101  |  7   ----------------------------<  97  3.8   |  22  |  0.65    Ca    8.8      2019 15:27  Phos  2.5     -14  Mg     1.8     -14        Urinalysis Basic - ( 2019 15:27 )    Color: Light Yellow / Appearance: Clear / S.016 / pH: x  Gluc: x / Ketone: Moderate  / Bili: Negative / Urobili: Negative   Blood: x / Protein: Trace / Nitrite: Negative   Leuk Esterase: Negative / RBC: x / WBC x   Sq Epi: x / Non Sq Epi: x / Bacteria: x        RADIOLOGY & ADDITIONAL STUDIES:

## 2019-11-16 LAB
HCT VFR BLD CALC: 29.8 % — LOW (ref 34.5–45)
HGB BLD-MCNC: 10 G/DL — LOW (ref 11.5–15.5)
MCHC RBC-ENTMCNC: 30 PG — SIGNIFICANT CHANGE UP (ref 27–34)
MCHC RBC-ENTMCNC: 33.6 GM/DL — SIGNIFICANT CHANGE UP (ref 32–36)
MCV RBC AUTO: 89.5 FL — SIGNIFICANT CHANGE UP (ref 80–100)
NRBC # BLD: 0 /100 WBCS — SIGNIFICANT CHANGE UP (ref 0–0)
PLATELET # BLD AUTO: 291 K/UL — SIGNIFICANT CHANGE UP (ref 150–400)
RBC # BLD: 3.33 M/UL — LOW (ref 3.8–5.2)
RBC # FLD: 13.2 % — SIGNIFICANT CHANGE UP (ref 10.3–14.5)
WBC # BLD: 10.64 K/UL — HIGH (ref 3.8–10.5)
WBC # FLD AUTO: 10.64 K/UL — HIGH (ref 3.8–10.5)

## 2019-11-16 PROCEDURE — 99232 SBSQ HOSP IP/OBS MODERATE 35: CPT

## 2019-11-16 PROCEDURE — 93926 LOWER EXTREMITY STUDY: CPT | Mod: 26,RT

## 2019-11-16 RX ORDER — HYDROMORPHONE HYDROCHLORIDE 2 MG/ML
1 INJECTION INTRAMUSCULAR; INTRAVENOUS; SUBCUTANEOUS ONCE
Refills: 0 | Status: COMPLETED | OUTPATIENT
Start: 2019-11-16 | End: 2019-11-23

## 2019-11-16 RX ORDER — PNEUMOCOCCAL 23-VAL P-SAC VAC 25MCG/0.5
0.5 VIAL (ML) INJECTION ONCE
Refills: 0 | Status: COMPLETED | OUTPATIENT
Start: 2019-11-16 | End: 2019-11-17

## 2019-11-16 RX ORDER — NEISSERIA MENINGITIDIS GROUP A CAPSULAR POLYSACCHARIDE TETANUS TOXOID CONJUGATE ANTIGEN, NEISSERIA MENINGITIDIS GROUP C CAPSULAR POLYSACCHARIDE TETANUS TOXOID CONJUGATE ANTIGEN, NEISSERIA MENINGITIDIS GROUP Y CAPSULAR POLYSACCHARIDE TETANUS TOXOID CONJUGATE ANTIGEN, AND NEISSERIA MENINGITIDIS GROUP W-135 CAPSULAR POLYSACCHARIDE TETANUS TOXOID CONJUGATE ANTIGEN 10; 10; 10; 10 UG/.5ML; UG/.5ML; UG/.5ML; UG/.5ML
0.5 INJECTION, SOLUTION INTRAMUSCULAR ONCE
Refills: 0 | Status: COMPLETED | OUTPATIENT
Start: 2019-11-16 | End: 2019-11-16

## 2019-11-16 RX ORDER — HYDROMORPHONE HYDROCHLORIDE 2 MG/ML
1 INJECTION INTRAMUSCULAR; INTRAVENOUS; SUBCUTANEOUS ONCE
Refills: 0 | Status: DISCONTINUED | OUTPATIENT
Start: 2019-11-16 | End: 2019-11-16

## 2019-11-16 RX ORDER — HAEMOPH B OLIGO CONJ-DIPHT CRM
0.5 VIAL (ML) INTRAMUSCULAR ONCE
Refills: 0 | Status: COMPLETED | OUTPATIENT
Start: 2019-11-16 | End: 2019-11-16

## 2019-11-16 RX ADMIN — OXYCODONE HYDROCHLORIDE 10 MILLIGRAM(S): 5 TABLET ORAL at 13:57

## 2019-11-16 RX ADMIN — OXYCODONE HYDROCHLORIDE 10 MILLIGRAM(S): 5 TABLET ORAL at 21:57

## 2019-11-16 RX ADMIN — Medication 975 MILLIGRAM(S): at 19:39

## 2019-11-16 RX ADMIN — OXYCODONE HYDROCHLORIDE 10 MILLIGRAM(S): 5 TABLET ORAL at 17:24

## 2019-11-16 RX ADMIN — OXYCODONE HYDROCHLORIDE 10 MILLIGRAM(S): 5 TABLET ORAL at 05:07

## 2019-11-16 RX ADMIN — HYDROMORPHONE HYDROCHLORIDE 1 MILLIGRAM(S): 2 INJECTION INTRAMUSCULAR; INTRAVENOUS; SUBCUTANEOUS at 22:07

## 2019-11-16 RX ADMIN — ENOXAPARIN SODIUM 40 MILLIGRAM(S): 100 INJECTION SUBCUTANEOUS at 11:35

## 2019-11-16 RX ADMIN — NEISSERIA MENINGITIDIS GROUP A CAPSULAR POLYSACCHARIDE TETANUS TOXOID CONJUGATE ANTIGEN, NEISSERIA MENINGITIDIS GROUP C CAPSULAR POLYSACCHARIDE TETANUS TOXOID CONJUGATE ANTIGEN, NEISSERIA MENINGITIDIS GROUP Y CAPSULAR POLYSACCHARIDE TETANUS TOXOID CONJUGATE ANTIGEN, AND NEISSERIA MENINGITIDIS GROUP W-135 CAPSULAR POLYSACCHARIDE TETANUS TOXOID CONJUGATE ANTIGEN 0.5 MILLILITER(S): 10; 10; 10; 10 INJECTION, SOLUTION INTRAMUSCULAR at 13:46

## 2019-11-16 RX ADMIN — Medication 0.5 MILLILITER(S): at 12:16

## 2019-11-16 RX ADMIN — OXYCODONE HYDROCHLORIDE 10 MILLIGRAM(S): 5 TABLET ORAL at 05:37

## 2019-11-16 RX ADMIN — OXYCODONE HYDROCHLORIDE 10 MILLIGRAM(S): 5 TABLET ORAL at 21:27

## 2019-11-16 RX ADMIN — Medication 975 MILLIGRAM(S): at 03:03

## 2019-11-16 RX ADMIN — Medication 975 MILLIGRAM(S): at 02:33

## 2019-11-16 RX ADMIN — Medication 975 MILLIGRAM(S): at 19:09

## 2019-11-16 RX ADMIN — Medication 1 PATCH: at 05:50

## 2019-11-16 RX ADMIN — HYDROMORPHONE HYDROCHLORIDE 1 MILLIGRAM(S): 2 INJECTION INTRAMUSCULAR; INTRAVENOUS; SUBCUTANEOUS at 21:37

## 2019-11-16 RX ADMIN — OXYCODONE HYDROCHLORIDE 10 MILLIGRAM(S): 5 TABLET ORAL at 09:42

## 2019-11-16 RX ADMIN — OXYCODONE HYDROCHLORIDE 10 MILLIGRAM(S): 5 TABLET ORAL at 00:01

## 2019-11-16 NOTE — PROGRESS NOTE ADULT - SUBJECTIVE AND OBJECTIVE BOX
SUBJECTIVE: Pt seen and examined at bedside with chief resident.  Had one fever overnight, Tmax 100.8 but remained hemodynamically stable  Starting to feel better, but reports eating gives her pain    MEDICATIONS  (STANDING):  enoxaparin Injectable 40 milliGRAM(s) SubCutaneous daily  haemophilus B conjugate Vaccine 0.5 milliLiter(s) IntraMuscular once  meningococcal/diphtheria Vaccine 0.5 milliLiter(s) IntraMuscular once  nicotine - 21 mG/24Hr(s) Patch 1 patch Transdermal daily  pneumococcal  23 Vaccine (PNEUMOVAX) 0.5 milliLiter(s) IntraMuscular once  senna 2 Tablet(s) Oral at bedtime    MEDICATIONS  (PRN):  acetaminophen   Tablet .. 975 milliGRAM(s) Oral every 6 hours PRN Temp greater or equal to 38C (100.4F), Mild Pain (1 - 3)  oxyCODONE    IR 10 milliGRAM(s) Oral every 4 hours PRN Severe Pain (7 - 10)  oxyCODONE    IR 5 milliGRAM(s) Oral every 4 hours PRN Moderate Pain (4 - 6)      OBJECTIVE:    Vital Signs Last 24 Hrs  T(C): 37.4 (2019 09:20), Max: 38.2 (15 Nov 2019 17:14)  T(F): 99.4 (2019 09:20), Max: 100.8 (15 Nov 2019 17:14)  HR: 92 (2019 09:20) (84 - 103)  BP: 113/67 (2019 09:20) (103/62 - 122/80)  BP(mean): --  RR: 18 (2019 09:20) (18 - 18)  SpO2: 94% (2019 09:20) (94% - 96%)    General Appearance: Resting comfortably, no acute distress  Chest: non-labored breathing, no respiratory distress  CV: Pulse regular presently  Abdomen: Soft, mildly tender, non-distended   Extremities: warm and well perfused    I&O's Summary    15 Nov 2019 07:01  -  2019 07:00  --------------------------------------------------------  IN: 780 mL / OUT: 500 mL / NET: 280 mL    2019 07:  -  2019 12:23  --------------------------------------------------------  IN: 280 mL / OUT: 0 mL / NET: 280 mL      I&O's Detail    15 Nov 2019 07:  -  2019 07:00  --------------------------------------------------------  IN:    Oral Fluid: 780 mL  Total IN: 780 mL    OUT:    Voided: 500 mL  Total OUT: 500 mL    Total NET: 280 mL      2019 07:  -  2019 12:23  --------------------------------------------------------  IN:    Oral Fluid: 280 mL  Total IN: 280 mL    OUT:  Total OUT: 0 mL    Total NET: 280 mL            LABS:                        10.0   10.64 )-----------( 291      ( 2019 08:49 )             29.8     11-14    136  |  101  |  7   ----------------------------<  97  3.8   |  22  |  0.65    Ca    8.8      2019 15:27  Phos  2.5     11-14  Mg     1.8     11-14        Urinalysis Basic - ( 2019 15:27 )    Color: Light Yellow / Appearance: Clear / S.016 / pH: x  Gluc: x / Ketone: Moderate  / Bili: Negative / Urobili: Negative   Blood: x / Protein: Trace / Nitrite: Negative   Leuk Esterase: Negative / RBC: x / WBC x   Sq Epi: x / Non Sq Epi: x / Bacteria: x        RADIOLOGY & ADDITIONAL STUDIES:

## 2019-11-16 NOTE — CHART NOTE - NSCHARTNOTEFT_GEN_A_CORE
Called by nurse as patient has a new pain in her right groin.    The patient says that her right groin hurts starting today. It is located only on the right, worse with movement or when it is touched, does not radiate down the leg or into the abdomen. She has no loss of movement or sensation or numbness or tingling on the right leg.     Physical:  General: in pain in groin  Abdomen: soft, nontender to palpation  Groin: right side puncture site with some swelling and edema, extremely tender to touch, femoral artery palpated with no clear mass felt, left sided femoral pules in tact, b/l legs with full ROM, warm, DP and PT pulses palpable bilaterally    Plan:  - Vascular doppler US for pseudoaneurysm of the right groin     ACS & Trauma, [id number]

## 2019-11-16 NOTE — PROGRESS NOTE ADULT - ASSESSMENT
34y Female w/o significant PMHx a/f grade 3-4 splenic laceration after mechanical fall, now PPD#4 s/p Splenic embolization using glue with IR. Now with tachycardia, fevers and increased pain. CT scan showing splenic infarction but no missed injuries. Continued fevers, but decreased Tmax    Plan:  - Full liquid diet as patient doesn't want solid food; can have whatever she wants   - Continue to monitor for fevers  - Post splenectomy vaccines    ACS & Trauma, [id number]

## 2019-11-17 VITALS
RESPIRATION RATE: 18 BRPM | TEMPERATURE: 100 F | HEART RATE: 88 BPM | DIASTOLIC BLOOD PRESSURE: 88 MMHG | SYSTOLIC BLOOD PRESSURE: 112 MMHG | OXYGEN SATURATION: 94 %

## 2019-11-17 LAB
ANION GAP SERPL CALC-SCNC: 13 MMOL/L — SIGNIFICANT CHANGE UP (ref 5–17)
BUN SERPL-MCNC: 10 MG/DL — SIGNIFICANT CHANGE UP (ref 7–23)
CALCIUM SERPL-MCNC: 9 MG/DL — SIGNIFICANT CHANGE UP (ref 8.4–10.5)
CHLORIDE SERPL-SCNC: 104 MMOL/L — SIGNIFICANT CHANGE UP (ref 96–108)
CO2 SERPL-SCNC: 25 MMOL/L — SIGNIFICANT CHANGE UP (ref 22–31)
CREAT SERPL-MCNC: 0.63 MG/DL — SIGNIFICANT CHANGE UP (ref 0.5–1.3)
GLUCOSE SERPL-MCNC: 100 MG/DL — HIGH (ref 70–99)
HCT VFR BLD CALC: 32.2 % — LOW (ref 34.5–45)
HGB BLD-MCNC: 10.5 G/DL — LOW (ref 11.5–15.5)
MAGNESIUM SERPL-MCNC: 2.1 MG/DL — SIGNIFICANT CHANGE UP (ref 1.6–2.6)
MCHC RBC-ENTMCNC: 29.9 PG — SIGNIFICANT CHANGE UP (ref 27–34)
MCHC RBC-ENTMCNC: 32.6 GM/DL — SIGNIFICANT CHANGE UP (ref 32–36)
MCV RBC AUTO: 91.7 FL — SIGNIFICANT CHANGE UP (ref 80–100)
PHOSPHATE SERPL-MCNC: 3.2 MG/DL — SIGNIFICANT CHANGE UP (ref 2.5–4.5)
PLATELET # BLD AUTO: 377 K/UL — SIGNIFICANT CHANGE UP (ref 150–400)
POTASSIUM SERPL-MCNC: 3.4 MMOL/L — LOW (ref 3.5–5.3)
POTASSIUM SERPL-SCNC: 3.4 MMOL/L — LOW (ref 3.5–5.3)
RBC # BLD: 3.51 M/UL — LOW (ref 3.8–5.2)
RBC # FLD: 13.3 % — SIGNIFICANT CHANGE UP (ref 10.3–14.5)
SODIUM SERPL-SCNC: 142 MMOL/L — SIGNIFICANT CHANGE UP (ref 135–145)
WBC # BLD: 10.75 K/UL — HIGH (ref 3.8–10.5)
WBC # FLD AUTO: 10.75 K/UL — HIGH (ref 3.8–10.5)

## 2019-11-17 PROCEDURE — 99285 EMERGENCY DEPT VISIT HI MDM: CPT | Mod: 25

## 2019-11-17 PROCEDURE — 76937 US GUIDE VASCULAR ACCESS: CPT

## 2019-11-17 PROCEDURE — 83036 HEMOGLOBIN GLYCOSYLATED A1C: CPT

## 2019-11-17 PROCEDURE — 84132 ASSAY OF SERUM POTASSIUM: CPT

## 2019-11-17 PROCEDURE — 71045 X-RAY EXAM CHEST 1 VIEW: CPT

## 2019-11-17 PROCEDURE — 90732 PPSV23 VACC 2 YRS+ SUBQ/IM: CPT

## 2019-11-17 PROCEDURE — 80053 COMPREHEN METABOLIC PANEL: CPT

## 2019-11-17 PROCEDURE — 90647 HIB PRP-OMP VACC 3 DOSE IM: CPT

## 2019-11-17 PROCEDURE — 93005 ELECTROCARDIOGRAM TRACING: CPT

## 2019-11-17 PROCEDURE — 82947 ASSAY GLUCOSE BLOOD QUANT: CPT

## 2019-11-17 PROCEDURE — 37244 VASC EMBOLIZE/OCCLUDE BLEED: CPT

## 2019-11-17 PROCEDURE — 83735 ASSAY OF MAGNESIUM: CPT

## 2019-11-17 PROCEDURE — 87040 BLOOD CULTURE FOR BACTERIA: CPT

## 2019-11-17 PROCEDURE — 81003 URINALYSIS AUTO W/O SCOPE: CPT

## 2019-11-17 PROCEDURE — 90734 MENACWYD/MENACWYCRM VACC IM: CPT

## 2019-11-17 PROCEDURE — 83605 ASSAY OF LACTIC ACID: CPT

## 2019-11-17 PROCEDURE — 93926 LOWER EXTREMITY STUDY: CPT

## 2019-11-17 PROCEDURE — 85610 PROTHROMBIN TIME: CPT

## 2019-11-17 PROCEDURE — 71260 CT THORAX DX C+: CPT

## 2019-11-17 PROCEDURE — C1769: CPT

## 2019-11-17 PROCEDURE — 94664 DEMO&/EVAL PT USE INHALER: CPT

## 2019-11-17 PROCEDURE — 87086 URINE CULTURE/COLONY COUNT: CPT

## 2019-11-17 PROCEDURE — 86900 BLOOD TYPING SEROLOGIC ABO: CPT

## 2019-11-17 PROCEDURE — 82330 ASSAY OF CALCIUM: CPT

## 2019-11-17 PROCEDURE — 99238 HOSP IP/OBS DSCHRG MGMT 30/<: CPT

## 2019-11-17 PROCEDURE — C1887: CPT

## 2019-11-17 PROCEDURE — 36247 INS CATH ABD/L-EXT ART 3RD: CPT

## 2019-11-17 PROCEDURE — 80048 BASIC METABOLIC PNL TOTAL CA: CPT

## 2019-11-17 PROCEDURE — 85730 THROMBOPLASTIN TIME PARTIAL: CPT

## 2019-11-17 PROCEDURE — 84145 PROCALCITONIN (PCT): CPT

## 2019-11-17 PROCEDURE — 85027 COMPLETE CBC AUTOMATED: CPT

## 2019-11-17 PROCEDURE — 85014 HEMATOCRIT: CPT

## 2019-11-17 PROCEDURE — 71275 CT ANGIOGRAPHY CHEST: CPT

## 2019-11-17 PROCEDURE — 82435 ASSAY OF BLOOD CHLORIDE: CPT

## 2019-11-17 PROCEDURE — 96374 THER/PROPH/DIAG INJ IV PUSH: CPT | Mod: XU

## 2019-11-17 PROCEDURE — 82565 ASSAY OF CREATININE: CPT

## 2019-11-17 PROCEDURE — 84100 ASSAY OF PHOSPHORUS: CPT

## 2019-11-17 PROCEDURE — 73060 X-RAY EXAM OF HUMERUS: CPT

## 2019-11-17 PROCEDURE — C1889: CPT

## 2019-11-17 PROCEDURE — C1894: CPT

## 2019-11-17 PROCEDURE — 84702 CHORIONIC GONADOTROPIN TEST: CPT

## 2019-11-17 PROCEDURE — 86901 BLOOD TYPING SEROLOGIC RH(D): CPT

## 2019-11-17 PROCEDURE — 73030 X-RAY EXAM OF SHOULDER: CPT

## 2019-11-17 PROCEDURE — 97165 OT EVAL LOW COMPLEX 30 MIN: CPT

## 2019-11-17 PROCEDURE — 82803 BLOOD GASES ANY COMBINATION: CPT

## 2019-11-17 PROCEDURE — 74177 CT ABD & PELVIS W/CONTRAST: CPT

## 2019-11-17 PROCEDURE — 94640 AIRWAY INHALATION TREATMENT: CPT

## 2019-11-17 PROCEDURE — 97161 PT EVAL LOW COMPLEX 20 MIN: CPT

## 2019-11-17 PROCEDURE — 93970 EXTREMITY STUDY: CPT

## 2019-11-17 PROCEDURE — 90686 IIV4 VACC NO PRSV 0.5 ML IM: CPT

## 2019-11-17 PROCEDURE — 86850 RBC ANTIBODY SCREEN: CPT

## 2019-11-17 PROCEDURE — 36248 INS CATH ABD/L-EXT ART ADDL: CPT

## 2019-11-17 PROCEDURE — 84295 ASSAY OF SERUM SODIUM: CPT

## 2019-11-17 RX ORDER — OXYCODONE HYDROCHLORIDE 5 MG/1
1 TABLET ORAL
Qty: 10 | Refills: 0
Start: 2019-11-17

## 2019-11-17 RX ORDER — SUMATRIPTAN SUCCINATE 4 MG/.5ML
25 INJECTION, SOLUTION SUBCUTANEOUS ONCE
Refills: 0 | Status: COMPLETED | OUTPATIENT
Start: 2019-11-17 | End: 2019-11-17

## 2019-11-17 RX ORDER — SUMATRIPTAN SUCCINATE 4 MG/.5ML
1 INJECTION, SOLUTION SUBCUTANEOUS
Qty: 9 | Refills: 0
Start: 2019-11-17

## 2019-11-17 RX ORDER — POTASSIUM CHLORIDE 20 MEQ
40 PACKET (EA) ORAL EVERY 4 HOURS
Refills: 0 | Status: COMPLETED | OUTPATIENT
Start: 2019-11-17 | End: 2019-11-17

## 2019-11-17 RX ORDER — INFLUENZA VIRUS VACCINE 15; 15; 15; 15 UG/.5ML; UG/.5ML; UG/.5ML; UG/.5ML
0.5 SUSPENSION INTRAMUSCULAR ONCE
Refills: 0 | Status: COMPLETED | OUTPATIENT
Start: 2019-11-17 | End: 2019-11-17

## 2019-11-17 RX ADMIN — Medication 975 MILLIGRAM(S): at 01:59

## 2019-11-17 RX ADMIN — OXYCODONE HYDROCHLORIDE 5 MILLIGRAM(S): 5 TABLET ORAL at 18:05

## 2019-11-17 RX ADMIN — OXYCODONE HYDROCHLORIDE 10 MILLIGRAM(S): 5 TABLET ORAL at 01:28

## 2019-11-17 RX ADMIN — Medication 975 MILLIGRAM(S): at 12:01

## 2019-11-17 RX ADMIN — SUMATRIPTAN SUCCINATE 25 MILLIGRAM(S): 4 INJECTION, SOLUTION SUBCUTANEOUS at 16:37

## 2019-11-17 RX ADMIN — OXYCODONE HYDROCHLORIDE 10 MILLIGRAM(S): 5 TABLET ORAL at 09:03

## 2019-11-17 RX ADMIN — OXYCODONE HYDROCHLORIDE 10 MILLIGRAM(S): 5 TABLET ORAL at 08:33

## 2019-11-17 RX ADMIN — SUMATRIPTAN SUCCINATE 25 MILLIGRAM(S): 4 INJECTION, SOLUTION SUBCUTANEOUS at 16:07

## 2019-11-17 RX ADMIN — Medication 975 MILLIGRAM(S): at 11:35

## 2019-11-17 RX ADMIN — OXYCODONE HYDROCHLORIDE 10 MILLIGRAM(S): 5 TABLET ORAL at 01:59

## 2019-11-17 RX ADMIN — Medication 975 MILLIGRAM(S): at 01:29

## 2019-11-17 RX ADMIN — Medication 40 MILLIEQUIVALENT(S): at 11:43

## 2019-11-17 RX ADMIN — ENOXAPARIN SODIUM 40 MILLIGRAM(S): 100 INJECTION SUBCUTANEOUS at 11:43

## 2019-11-17 RX ADMIN — Medication 0.5 MILLILITER(S): at 16:00

## 2019-11-17 RX ADMIN — OXYCODONE HYDROCHLORIDE 10 MILLIGRAM(S): 5 TABLET ORAL at 13:59

## 2019-11-17 RX ADMIN — Medication 40 MILLIEQUIVALENT(S): at 14:00

## 2019-11-17 RX ADMIN — OXYCODONE HYDROCHLORIDE 10 MILLIGRAM(S): 5 TABLET ORAL at 14:30

## 2019-11-17 RX ADMIN — INFLUENZA VIRUS VACCINE 0.5 MILLILITER(S): 15; 15; 15; 15 SUSPENSION INTRAMUSCULAR at 15:57

## 2019-11-17 NOTE — PROGRESS NOTE ADULT - SUBJECTIVE AND OBJECTIVE BOX
SUBJECTIVE: Pt seen and examined at bedside with chief resident.  Yesterday, patient had significant pain at groin access site on right side- new from previous. She underwent a doppler vascular US to evaluate for pseudoaneurysm-- prelim read no pseudoaneurysm  After patient went to US, had much less pain and was able to sleep  Yesterday received post-splenectomy vaccines prophylactically     MEDICATIONS  (STANDING):  enoxaparin Injectable 40 milliGRAM(s) SubCutaneous daily  nicotine - 21 mG/24Hr(s) Patch 1 patch Transdermal daily  pneumococcal  23 Vaccine (PNEUMOVAX) 0.5 milliLiter(s) IntraMuscular once  senna 2 Tablet(s) Oral at bedtime    MEDICATIONS  (PRN):  acetaminophen   Tablet .. 975 milliGRAM(s) Oral every 6 hours PRN Temp greater or equal to 38C (100.4F), Mild Pain (1 - 3)  oxyCODONE    IR 10 milliGRAM(s) Oral every 4 hours PRN Severe Pain (7 - 10)  oxyCODONE    IR 5 milliGRAM(s) Oral every 4 hours PRN Moderate Pain (4 - 6)      OBJECTIVE:    Vital Signs Last 24 Hrs  T(C): 37 (17 Nov 2019 05:03), Max: 37.4 (16 Nov 2019 09:20)  T(F): 98.6 (17 Nov 2019 05:03), Max: 99.4 (16 Nov 2019 09:20)  HR: 83 (17 Nov 2019 05:03) (80 - 98)  BP: 105/70 (17 Nov 2019 05:03) (105/70 - 136/75)  BP(mean): --  RR: 18 (17 Nov 2019 05:03) (18 - 18)  SpO2: 94% (17 Nov 2019 05:03) (94% - 97%)    General Appearance: Resting comfortably, no acute distress  Chest: non-labored breathing, no respiratory distress  CV: Pulse regular presently  Abdomen: Soft, non-tender, non-distended, groin site tender  Extremities: warm and well perfused, DP and PT pulses bilaterally, no neuro deficits or numbness or tingling in leg     I&O's Summary    15 Nov 2019 07:01  -  16 Nov 2019 07:00  --------------------------------------------------------  IN: 780 mL / OUT: 500 mL / NET: 280 mL    16 Nov 2019 07:01  -  17 Nov 2019 05:24  --------------------------------------------------------  IN: 840 mL / OUT: 0 mL / NET: 840 mL      I&O's Detail    15 Nov 2019 07:01  -  16 Nov 2019 07:00  --------------------------------------------------------  IN:    Oral Fluid: 780 mL  Total IN: 780 mL    OUT:    Voided: 500 mL  Total OUT: 500 mL    Total NET: 280 mL      16 Nov 2019 07:01  -  17 Nov 2019 05:24  --------------------------------------------------------  IN:    Oral Fluid: 840 mL  Total IN: 840 mL    OUT:  Total OUT: 0 mL    Total NET: 840 mL            LABS:                        10.0   10.64 )-----------( 291      ( 16 Nov 2019 08:49 )             29.8                 RADIOLOGY & ADDITIONAL STUDIES:

## 2019-11-17 NOTE — PROGRESS NOTE ADULT - ASSESSMENT
34y Female w/o significant PMHx a/f grade 3-4 splenic laceration after mechanical fall, now PPD#5 s/p Splenic embolization using glue with IR. Now with tachycardia, fevers and increased pain. CT scan showing splenic infarction but no missed injuries. Continued fevers, but decreased Tmax. Concern for pseudoaneurysm at site of     Plan:  - Full liquid diet as patient doesn't want solid food; can have whatever she wants   - Continue to monitor for fevers  - Continue to monitor H/H  - Monitor for tenderness in right groin     ACS & Trauma, [id number]

## 2019-11-17 NOTE — DISCHARGE NOTE PROVIDER - NSDCCPCAREPLAN_GEN_ALL_CORE_FT
PRINCIPAL DISCHARGE DIAGNOSIS  Diagnosis: Splenic laceration, initial encounter  Assessment and Plan of Treatment: FOLLOW-UP: Please follow up with your primary care physician in one week regarding your hospitalization, and follow up with your surgeon in 7-10 days. Call to schedule an appointment.  BATHING: You may shower and/or bathe as normal.  ACTIVITY: No heavy lifting or straining. Otherwise, you may return to your usual level of physical activity. If you are taking narcotic pain medication (such as Percocet) DO NOT drive a car, operate machinery or make important decisions.  DIET: You may return to your regular diet.  NOTIFY YOUR SURGEON IF: You have any bleeding that does not stop, any pus draining from your wound(s), any fever (over 100.4 F) or chills, persistent nausea/vomiting, persistent diarrhea, or if your pain is not controlled on your discharge pain medications.      SECONDARY DISCHARGE DIAGNOSES  Diagnosis: Fall down stairs, initial encounter  Assessment and Plan of Treatment:

## 2019-11-17 NOTE — DISCHARGE NOTE NURSING/CASE MANAGEMENT/SOCIAL WORK - NSDCFUADDAPPT_GEN_ALL_CORE_FT
Please follow up with your primary care physician or [facility] Internal Medicine. Call to set up an appointment in 1-2 weeks.    Follow up with Dr. Virgen as needed.

## 2019-11-17 NOTE — DISCHARGE NOTE PROVIDER - NSDCFUADDAPPT_GEN_ALL_CORE_FT
Please follow up with primary care physician or [facility] Internal medicine. Please follow up with your primary care physician or [facility] Internal [facility]. Call to set up an appointment in 1-2 weeks.    Follow up with Dr. Virgen as needed.

## 2019-11-17 NOTE — PROGRESS NOTE ADULT - REASON FOR ADMISSION
Abdominal Pain

## 2019-11-17 NOTE — DISCHARGE NOTE PROVIDER - CARE PROVIDER_API CALL
Ashley Virgen (MD)  Surgery; Surgical Critical Care  [address], [address] [address]  Phone: [phone]  Fax: [phone]  Follow Up Time:

## 2019-11-17 NOTE — DISCHARGE NOTE NURSING/CASE MANAGEMENT/SOCIAL WORK - NSDCVIVACCINE_GEN_ALL_CORE_FT
Haemophilus Influenza, type b , 2019/11/16 12:16 , Katelyn Vigil (RN)  Meningococcal , 2019/11/16 13:46 , Katelyn Vigil (RN)

## 2019-11-17 NOTE — DISCHARGE NOTE PROVIDER - NSFOLLOWUPCLINICS_GEN_ALL_ED_FT
[facility] General Internal Medicine  General Internal Medicine  [address] [id number]  Phone: [phone]  Fax:   Follow Up Time:

## 2019-11-17 NOTE — PROGRESS NOTE ADULT - ATTENDING COMMENTS
Patient seen and examined.   The patient today is complaining of right groin pain. She has appropriate perfusion along her entire extremity. She denies abdominal pain today.   She is hemodynamically appropriate.   Labs reviewed  Abdominal pain remains soft  RIght groin appears full and tender at the site    Patient with splenic emoblization after Grade 3-4 splenic injury- continue to monitor daily H/H    right groin pain - rule out pseudoaneurysm vs hematoma  -pending ultrasound
Pt seen and examined on 11/12, agree with above. Hct stable after splenic embolization, pain better with PCA. Plan to follow up R shoulder and humerus x-rays.
Pt seen and examined on 11/15, agree with above. Pt feeling better, starting to tolerate more po. Had fever, follow up fever workup, although suspect fever is from large segment of embolized spleen.
Pt seen and examined, agree with above. Acute posthemorrhagic anemia from splenic injury s/p angioembolization: stable. 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 I advised patient to avoid NSAIDs for the next few weeks. R groin pain: since yesterday.  to palpation, but no palpable thrill, hernia, or pulsatile mass. Ultrasound negative for pseudoaneurysm. Likely pain from hematoma, although unclear why started yesterday. Given extensive infarction of spleen, has received post-splenectomy vaccines, although likely has significant splenic function from residual spleen. I advised patient to follow up with Internal Medicine as outpatient as she will need to continue vaccination schedule, to which patient agreed. She stated that she is comfortable going home and would like to be discharged today.
Pt seen and examined, agree with above. Pt says that when she was walking today, she had acute onset of worsening left upper quadrant pain. She has also been febrile and tachycardic. Concern for PE, complete splenic infarction, abscess (although it would be early for this). As patient remains stable, will not empirically begin antibiotics, but will repeat CT today: CTA to rule out PE, and CT a/p to evaluate for complete splenic infarction, which could cause these symptoms. Given overall stability, lack of peritonitis, and three days in hospital tolerating po well with flatus and no N/V, doubt missed bowel injury, but if this is the case, would also expect to see pneumoperitoneum on CT at this point.    Hct stable, chemical VTE prophylaxis started. Lower extremity screening venous duplex per trauma protocol was negative for DVT.
Pain control with PCA  HCT stable  DC IVF, advance diet  Mobilization  Transfer
S/P mechanical fall with grade III-IV splenic laceration and moderate-large hemoperitoneum s/p glue embolization of splenic pseudoaneurysm  Mild drop in HCT post transfusion, continue to monitor with serial CBC  Pain control with PCA  IVF  PO if tolerates   Mechanical DVT prophylaxis for now

## 2019-11-17 NOTE — DISCHARGE NOTE NURSING/CASE MANAGEMENT/SOCIAL WORK - PATIENT PORTAL LINK FT
You can access the FollowMyHealth Patient Portal offered by [facility] by registering at the following website: [email or web address]. By joining [employer]’s FollowMyHealth portal, you will also be able to view your health information using other applications (apps) compatible with our system.

## 2019-11-17 NOTE — DISCHARGE NOTE PROVIDER - HOSPITAL COURSE
34 year old F with no significant pmhx or pshx who presents to the ED after sustaining a fall down multiple steps yesterday. Patient sustained a mechanical fall while taking out the garbage yesterday afternoon, denies LOC or head impact and is not on any anticoagulation. Patient was in her usual state of health prior to the fall and denies any preceding dizziness, or blurry vision. Patient recalls the sequence of events and was able to ambulate after the fall by herself. She initially felt well but was experiencing worsening chest and abdominal pain prompting the visit to the ED. On admission, the patient was hemodynamically stable, but had developed hypotension, which initially responded to 1 L saline boluses. Imaging revealed a large hemoperitoneum, with a foci of hyperdensity suspicious for acute hemorrhage. IR and SICU consulted, and patient went to SICU for resuscitation, and then to IR for embolization of spleen. Please see IR documentation for further details. Post embolization, patient remained hemodynamically appropriate. She was moved to the floor from the SICU. Swelling and pain in her R groin where IR had accessed her artery for embolization prompted evaluation with an ultrasound which showed no evidence of pseudoanuerysm. Patient remained hemodynamically stable and was cleared per Dr. Fairbanks for discharge; tolerating regular diet, voiding appropriately, ambulating, and with pain well controlled on oral analgesics.

## 2019-11-17 NOTE — DISCHARGE NOTE PROVIDER - NSDCFUADDINST_GEN_ALL_CORE_FT
BATHING: Please do not submerge wound underwater. You may shower and/or sponge bathe. Do not scrub incisions or apply lotions.  ACTIVITY: Your weight bearing status is activity as tolerated. If you are taking narcotic pain medication (such as oxycodone), do NOT drive a car, operate machinery or make important decisions.  DIET: Return to your usual diet.  NOTIFY YOUR SURGEON IF: You have any bleeding that does not stop, any pus draining from your wound, any fever (over 100.4 F) or chills, persistent nausea/vomiting, persistent diarrhea, or if your pain is not controlled on your discharge pain medications.  PAIN CONTROL: Please take medication as prescribed. If you have been prescribed a narcotic (such as Oxycodone), please be aware that narcotic pain medicine can cause extreme nausea and constipation. Drink plenty of water and take diuretics (colace, Miralax) as needed. You can get them from your local pharmacy. If you have been prescribed a narcotic (such as Oxycodone), please take for severe pain only. You can take up to 8 Tylenol 325 mg a day while taking Oxycodone. Alternate between taking over the counter Ibuprofen and Tylenol so you are taking pain medication every 3-4 hours if your pain is severe.  FOLLOW-UP:  1. Follow-up with Dr. Virgen as needed. Please call office for appointment as needed [phone].  2. Please follow up with your primary care physician or [facility] within 2 weeks regarding your hospitalization. Call his/her office to make an appointment.  3. Make sure to receive yearly flu shots and booster shots for splenic vaccines.

## 2019-11-19 LAB
CULTURE RESULTS: SIGNIFICANT CHANGE UP
CULTURE RESULTS: SIGNIFICANT CHANGE UP
SPECIMEN SOURCE: SIGNIFICANT CHANGE UP
SPECIMEN SOURCE: SIGNIFICANT CHANGE UP

## 2020-07-28 NOTE — DISCHARGE NOTE PROVIDER - PROVIDER RX CONTACT NUMBER
Prep Survey      Responses   [facility] facility patient discharged from?  [place]   Is LACE score < 7 ?  Yes   Eligibility  Readm Mgmt   Discharge diagnosis  NSTEMI   COVID-19 Test Status  Negative   Does the patient have one of the following disease processes/diagnoses(primary or secondary)?  Acute MI (STEMI,NSTEMI)   Does the patient have Home health ordered?  No   Is there a DME ordered?  No   Comments regarding appointments  Per AVS   Medication alerts for this patient  started on aspirin and plavix   Prep survey completed?  Yes          Cynthia Arzola RN         [phone]

## 2022-06-30 NOTE — ED PROVIDER NOTE - NS ED MD DISPO DIVISION
[Never] : Never [No] : In the past 12 months have you used drugs other than those required for medical reasons? No [No falls in past year] : Patient reported no falls in the past year [0] : 2) Feeling down, depressed, or hopeless: Not at all (0) [PHQ-2 Negative - No further assessment needed] : PHQ-2 Negative - No further assessment needed [[id number]] : 0 [facility]